# Patient Record
Sex: MALE | Race: WHITE | NOT HISPANIC OR LATINO | Employment: FULL TIME | URBAN - METROPOLITAN AREA
[De-identification: names, ages, dates, MRNs, and addresses within clinical notes are randomized per-mention and may not be internally consistent; named-entity substitution may affect disease eponyms.]

---

## 2018-01-22 ENCOUNTER — OFFICE VISIT (OUTPATIENT)
Dept: NURSING | Facility: CLINIC | Age: 38
End: 2018-01-22
Payer: COMMERCIAL

## 2018-01-22 DIAGNOSIS — R07.9 CHEST PAIN: Primary | ICD-10-CM

## 2018-01-22 PROCEDURE — 99207 ZZC NO CHARGE NURSE ONLY: CPT

## 2018-01-22 NOTE — NURSING NOTE
RN Triage:     Chief Complaint   Patient presents with     Chest Pain       Initial There were no vitals taken for this visit. There is no height or weight on file to calculate BMI.      Assessment:  Pt walked into the clinic c/o chest pain, SOB, chills and sweats, in distress, difficulty speaking.     Triage Dispo: Huddle with Provider to determine plan: Nitro SL one spray and chewable ASA 81 mg, O2 2.5 L      Intervention: Ambulance called.    Marisabel Simeon RN

## 2018-01-22 NOTE — MR AVS SNAPSHOT
"              After Visit Summary   2018    Morgan Wills    MRN: [id number]           Patient Information     Date Of Birth          1980        Visit Information        Provider Department      2018 10:20 AM BA ANCILLARY [facility]        Today's Diagnoses     Chest pain    -  1      Care Instructions    Please refer to nurse note          Follow-ups after your visit        Who to contact     If you have questions or need follow up information about today's clinic visit or your schedule please contact [facility] directly at [phone].  Normal or non-critical lab and imaging results will be communicated to you by MyChart, letter or phone within 4 business days after the clinic has received the results. If you do not hear from us within 7 days, please contact the clinic through [employer] or phone. If you have a critical or abnormal lab result, we will notify you by phone as soon as possible.  Submit refill requests through [employer] or call your pharmacy and they will forward the refill request to us. Please allow 3 business days for your refill to be completed.          Additional Information About Your Visit        MyChart Information     [employer] lets you send messages to your doctor, view your test results, renew your prescriptions, schedule appointments and more. To sign up, go to [email or web address] . Click on \"Log in\" on the left side of the screen, which will take you to the Welcome page. Then click on \"Sign up Now\" on the right side of the page.     You will be asked to enter the access code listed below, as well as some personal information. Please follow the directions to create your username and password.     Your access code is: [email or web address]  Expires: 2018  8:54 AM     Your access code will  in 90 days. If you need help or a new code, please call your [facility] or [phone].        Care EveryWhere ID     This is your Care EveryWhere ID. " This could be used by other organizations to access your [place] medical records  [id number]         Blood Pressure from Last 3 Encounters:   No data found for BP    Weight from Last 3 Encounters:   No data found for Wt              Today, you had the following     No orders found for display       Primary Care Provider Office Phone # Fax #    [facility] [phone] [address] [facility] [address]        Equal Access to Services     LORY VASQUEZ : Hadii aad ku hadasho Soomaali, waaxda luqadaha, qaybta kaalmada adeegyada, waxay idiin hayaan adeeg kharash lachris santamaria. So [facility] [phone].    ATENCIÓN: Si habla español, tiene a matute disposición servicios gratuitos de asistencia lingüística. Llame al [phone].    We comply with applicable federal civil rights laws and [place] laws. We do not discriminate on the basis of race, color, national origin, age, disability, sex, sexual orientation, or gender identity.            Thank you!     Thank you for choosing [facility]  for your care. Our goal is always to provide you with excellent care. Hearing back from our patients is one way we can continue to improve our services. Please take a few minutes to complete the written survey that you may receive in the mail after your visit with us. Thank you!             Your Updated Medication List - Protect others around you: Learn how to safely use, store and throw away your medicines at www.disposemymeds.org.      Notice  As of 1/22/2018 11:59 PM    You have not been prescribed any medications.

## 2019-02-17 ENCOUNTER — TRANSFERRED RECORDS (OUTPATIENT)
Dept: HEALTH INFORMATION MANAGEMENT | Facility: CLINIC | Age: 39
End: 2019-02-17

## 2019-02-18 LAB
CREAT SERPL-MCNC: 0.81 MG/DL (ref 0.7–1.3)
GFR SERPL CREATININE-BSD FRML MDRD: >60 ML/MIN/1.73M2
GLUCOSE SERPL-MCNC: 99 MG/DL (ref 70–125)

## 2019-02-19 LAB
ALT SERPL-CCNC: 28 U/L (ref 0–45)
AST SERPL-CCNC: 16 U/L (ref 0–40)
TRIGL SERPL-MCNC: 272 MG/DL

## 2019-02-20 ENCOUNTER — ALLIED HEALTH/NURSE VISIT (OUTPATIENT)
Dept: NEUROLOGY | Facility: CLINIC | Age: 39
End: 2019-02-20
Attending: PSYCHIATRY & NEUROLOGY
Payer: COMMERCIAL

## 2019-02-20 ENCOUNTER — HOSPITAL ENCOUNTER (INPATIENT)
Facility: CLINIC | Age: 39
LOS: 2 days | Discharge: HOME | End: 2019-02-22
Attending: PSYCHIATRY & NEUROLOGY | Admitting: PSYCHIATRY & NEUROLOGY
Payer: COMMERCIAL

## 2019-02-20 ENCOUNTER — APPOINTMENT (OUTPATIENT)
Dept: MRI IMAGING | Facility: CLINIC | Age: 39
End: 2019-02-20
Attending: STUDENT IN AN ORGANIZED HEALTH CARE EDUCATION/TRAINING PROGRAM
Payer: COMMERCIAL

## 2019-02-20 DIAGNOSIS — R56.9 SEIZURES (H): Primary | ICD-10-CM

## 2019-02-20 DIAGNOSIS — F44.5 PSYCHOGENIC NONEPILEPTIC SEIZURE: Primary | ICD-10-CM

## 2019-02-20 PROBLEM — R40.4 SPELL OF ALTERED CONSCIOUSNESS: Status: ACTIVE | Noted: 2019-02-20

## 2019-02-20 LAB
ALBUMIN SERPL-MCNC: 3.7 G/DL (ref 3.4–5)
ALP SERPL-CCNC: 77 U/L (ref 40–150)
ALT SERPL W P-5'-P-CCNC: 44 U/L (ref 0–70)
ANION GAP SERPL CALCULATED.3IONS-SCNC: 8 MMOL/L (ref 3–14)
AST SERPL W P-5'-P-CCNC: 22 U/L (ref 0–45)
BILIRUB SERPL-MCNC: 0.4 MG/DL (ref 0.2–1.3)
BUN SERPL-MCNC: 13 MG/DL (ref 7–30)
CALCIUM SERPL-MCNC: 9 MG/DL (ref 8.5–10.1)
CHLORIDE SERPL-SCNC: 104 MMOL/L (ref 94–109)
CO2 SERPL-SCNC: 27 MMOL/L (ref 20–32)
CREAT SERPL-MCNC: 0.81 MG/DL (ref 0.66–1.25)
ERYTHROCYTE [DISTWIDTH] IN BLOOD BY AUTOMATED COUNT: 12.3 % (ref 10–15)
GFR SERPL CREATININE-BSD FRML MDRD: >90 ML/MIN/{1.73_M2}
GLUCOSE BLDC GLUCOMTR-MCNC: 163 MG/DL (ref 70–99)
GLUCOSE SERPL-MCNC: 96 MG/DL (ref 70–99)
HCT VFR BLD AUTO: 42.7 % (ref 40–53)
HGB BLD-MCNC: 14.9 G/DL (ref 13.3–17.7)
MCH RBC QN AUTO: 29.6 PG (ref 26.5–33)
MCHC RBC AUTO-ENTMCNC: 34.9 G/DL (ref 31.5–36.5)
MCV RBC AUTO: 85 FL (ref 78–100)
PLATELET # BLD AUTO: 202 10E9/L (ref 150–450)
POTASSIUM SERPL-SCNC: 3.9 MMOL/L (ref 3.4–5.3)
POTASSIUM SERPL-SCNC: 4.1 MMOL/L (ref 3.5–5)
PROT SERPL-MCNC: 7.2 G/DL (ref 6.8–8.8)
RBC # BLD AUTO: 5.04 10E12/L (ref 4.4–5.9)
SODIUM SERPL-SCNC: 139 MMOL/L (ref 133–144)
WBC # BLD AUTO: 6.4 10E9/L (ref 4–11)

## 2019-02-20 PROCEDURE — 93010 ELECTROCARDIOGRAM REPORT: CPT | Performed by: INTERNAL MEDICINE

## 2019-02-20 PROCEDURE — 25000132 ZZH RX MED GY IP 250 OP 250 PS 637: Performed by: STUDENT IN AN ORGANIZED HEALTH CARE EDUCATION/TRAINING PROGRAM

## 2019-02-20 PROCEDURE — 36415 COLL VENOUS BLD VENIPUNCTURE: CPT | Performed by: STUDENT IN AN ORGANIZED HEALTH CARE EDUCATION/TRAINING PROGRAM

## 2019-02-20 PROCEDURE — 25500064 ZZH RX 255 OP 636: Performed by: PSYCHIATRY & NEUROLOGY

## 2019-02-20 PROCEDURE — 70553 MRI BRAIN STEM W/O & W/DYE: CPT

## 2019-02-20 PROCEDURE — A9585 GADOBUTROL INJECTION: HCPCS | Performed by: PSYCHIATRY & NEUROLOGY

## 2019-02-20 PROCEDURE — 95951 ZZHC EEG VIDEO < 12 HR: CPT | Mod: 52,ZF

## 2019-02-20 PROCEDURE — 85027 COMPLETE CBC AUTOMATED: CPT | Performed by: STUDENT IN AN ORGANIZED HEALTH CARE EDUCATION/TRAINING PROGRAM

## 2019-02-20 PROCEDURE — 12000001 ZZH R&B MED SURG/OB UMMC

## 2019-02-20 PROCEDURE — 93005 ELECTROCARDIOGRAM TRACING: CPT

## 2019-02-20 PROCEDURE — 00000146 ZZHCL STATISTIC GLUCOSE BY METER IP

## 2019-02-20 PROCEDURE — 80053 COMPREHEN METABOLIC PANEL: CPT | Performed by: STUDENT IN AN ORGANIZED HEALTH CARE EDUCATION/TRAINING PROGRAM

## 2019-02-20 PROCEDURE — 40000802 ZZH SITE CHECK

## 2019-02-20 RX ORDER — ESCITALOPRAM OXALATE 5 MG/1
20 TABLET ORAL DAILY
COMMUNITY

## 2019-02-20 RX ORDER — GADOBUTROL 604.72 MG/ML
10 INJECTION INTRAVENOUS ONCE
Status: COMPLETED | OUTPATIENT
Start: 2019-02-20 | End: 2019-02-20

## 2019-02-20 RX ORDER — PANTOPRAZOLE SODIUM 40 MG/1
40 TABLET, DELAYED RELEASE ORAL 2 TIMES DAILY
Status: DISCONTINUED | OUTPATIENT
Start: 2019-02-20 | End: 2019-02-22 | Stop reason: HOSPADM

## 2019-02-20 RX ORDER — AMOXICILLIN 250 MG
2 CAPSULE ORAL
Status: DISCONTINUED | OUTPATIENT
Start: 2019-02-20 | End: 2019-02-22 | Stop reason: HOSPADM

## 2019-02-20 RX ORDER — AMOXICILLIN 250 MG
1 CAPSULE ORAL
Status: DISCONTINUED | OUTPATIENT
Start: 2019-02-20 | End: 2019-02-22 | Stop reason: HOSPADM

## 2019-02-20 RX ORDER — ESCITALOPRAM OXALATE 10 MG/1
20 TABLET ORAL DAILY
Status: DISCONTINUED | OUTPATIENT
Start: 2019-02-21 | End: 2019-02-22 | Stop reason: HOSPADM

## 2019-02-20 RX ORDER — ONDANSETRON 2 MG/ML
4 INJECTION INTRAMUSCULAR; INTRAVENOUS EVERY 6 HOURS PRN
Status: DISCONTINUED | OUTPATIENT
Start: 2019-02-20 | End: 2019-02-22 | Stop reason: HOSPADM

## 2019-02-20 RX ORDER — ACETAMINOPHEN 325 MG/1
650 TABLET ORAL EVERY 4 HOURS PRN
Status: DISCONTINUED | OUTPATIENT
Start: 2019-02-20 | End: 2019-02-22 | Stop reason: HOSPADM

## 2019-02-20 RX ORDER — LORAZEPAM 2 MG/ML
2 INJECTION INTRAMUSCULAR
Status: DISCONTINUED | OUTPATIENT
Start: 2019-02-20 | End: 2019-02-22 | Stop reason: HOSPADM

## 2019-02-20 RX ORDER — NALOXONE HYDROCHLORIDE 0.4 MG/ML
.1-.4 INJECTION, SOLUTION INTRAMUSCULAR; INTRAVENOUS; SUBCUTANEOUS
Status: DISCONTINUED | OUTPATIENT
Start: 2019-02-20 | End: 2019-02-22 | Stop reason: HOSPADM

## 2019-02-20 RX ORDER — ONDANSETRON 4 MG/1
4 TABLET, ORALLY DISINTEGRATING ORAL EVERY 6 HOURS PRN
Status: DISCONTINUED | OUTPATIENT
Start: 2019-02-20 | End: 2019-02-22 | Stop reason: HOSPADM

## 2019-02-20 RX ORDER — DIVALPROEX SODIUM 250 MG/1
1000 TABLET, DELAYED RELEASE ORAL
Status: DISCONTINUED | OUTPATIENT
Start: 2019-02-20 | End: 2019-02-21

## 2019-02-20 RX ORDER — LIDOCAINE 40 MG/G
CREAM TOPICAL
Status: DISCONTINUED | OUTPATIENT
Start: 2019-02-20 | End: 2019-02-22 | Stop reason: HOSPADM

## 2019-02-20 RX ORDER — PANTOPRAZOLE SODIUM 40 MG/1
40 TABLET, DELAYED RELEASE ORAL 2 TIMES DAILY
COMMUNITY

## 2019-02-20 RX ORDER — DIVALPROEX SODIUM 500 MG/1
1000 TABLET, DELAYED RELEASE ORAL
COMMUNITY

## 2019-02-20 RX ADMIN — ACETAMINOPHEN 650 MG: 325 TABLET, FILM COATED ORAL at 20:37

## 2019-02-20 RX ADMIN — PANTOPRAZOLE SODIUM 40 MG: 40 TABLET, DELAYED RELEASE ORAL at 20:37

## 2019-02-20 RX ADMIN — GADOBUTROL 10 ML: 604.72 INJECTION INTRAVENOUS at 19:32

## 2019-02-20 RX ADMIN — DIVALPROEX SODIUM 1000 MG: 250 TABLET, DELAYED RELEASE ORAL at 18:07

## 2019-02-20 ASSESSMENT — VISUAL ACUITY
OU: DOUBLE VISION/DIPLOPIA
OU: DOUBLE VISION/DIPLOPIA

## 2019-02-20 ASSESSMENT — ACTIVITIES OF DAILY LIVING (ADL): ADLS_ACUITY_SCORE: 21
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
## 2019-02-21 ENCOUNTER — ALLIED HEALTH/NURSE VISIT (OUTPATIENT)
Dept: NEUROLOGY | Facility: CLINIC | Age: 39
End: 2019-02-21
Attending: PSYCHIATRY & NEUROLOGY
Payer: COMMERCIAL

## 2019-02-21 ENCOUNTER — APPOINTMENT (OUTPATIENT)
Dept: PHYSICAL THERAPY | Facility: CLINIC | Age: 39
End: 2019-02-21
Attending: STUDENT IN AN ORGANIZED HEALTH CARE EDUCATION/TRAINING PROGRAM
Payer: COMMERCIAL

## 2019-02-21 DIAGNOSIS — R56.9 SEIZURES (H): Primary | ICD-10-CM

## 2019-02-21 LAB
ANION GAP SERPL CALCULATED.3IONS-SCNC: 6 MMOL/L (ref 3–14)
BUN SERPL-MCNC: 15 MG/DL (ref 7–30)
CALCIUM SERPL-MCNC: 9.1 MG/DL (ref 8.5–10.1)
CHLORIDE SERPL-SCNC: 104 MMOL/L (ref 94–109)
CO2 SERPL-SCNC: 27 MMOL/L (ref 20–32)
CREAT SERPL-MCNC: 0.78 MG/DL (ref 0.66–1.25)
ERYTHROCYTE [DISTWIDTH] IN BLOOD BY AUTOMATED COUNT: 12.2 % (ref 10–15)
GFR SERPL CREATININE-BSD FRML MDRD: >90 ML/MIN/{1.73_M2}
GLUCOSE SERPL-MCNC: 92 MG/DL (ref 70–99)
HCT VFR BLD AUTO: 43.1 % (ref 40–53)
HGB BLD-MCNC: 14.9 G/DL (ref 13.3–17.7)
INTERPRETATION ECG - MUSE: NORMAL
MCH RBC QN AUTO: 29.3 PG (ref 26.5–33)
MCHC RBC AUTO-ENTMCNC: 34.6 G/DL (ref 31.5–36.5)
MCV RBC AUTO: 85 FL (ref 78–100)
PLATELET # BLD AUTO: 190 10E9/L (ref 150–450)
POTASSIUM SERPL-SCNC: 4 MMOL/L (ref 3.4–5.3)
RBC # BLD AUTO: 5.09 10E12/L (ref 4.4–5.9)
SODIUM SERPL-SCNC: 138 MMOL/L (ref 133–144)
WBC # BLD AUTO: 5.5 10E9/L (ref 4–11)

## 2019-02-21 PROCEDURE — 95951 ZZHC EEG VIDEO EACH 24 HR: CPT | Mod: ZF

## 2019-02-21 PROCEDURE — 36415 COLL VENOUS BLD VENIPUNCTURE: CPT | Performed by: STUDENT IN AN ORGANIZED HEALTH CARE EDUCATION/TRAINING PROGRAM

## 2019-02-21 PROCEDURE — 25000132 ZZH RX MED GY IP 250 OP 250 PS 637: Performed by: STUDENT IN AN ORGANIZED HEALTH CARE EDUCATION/TRAINING PROGRAM

## 2019-02-21 PROCEDURE — 80048 BASIC METABOLIC PNL TOTAL CA: CPT | Performed by: STUDENT IN AN ORGANIZED HEALTH CARE EDUCATION/TRAINING PROGRAM

## 2019-02-21 PROCEDURE — 12000001 ZZH R&B MED SURG/OB UMMC

## 2019-02-21 PROCEDURE — 97162 PT EVAL MOD COMPLEX 30 MIN: CPT | Mod: GP

## 2019-02-21 PROCEDURE — 97530 THERAPEUTIC ACTIVITIES: CPT | Mod: GP

## 2019-02-21 PROCEDURE — 85027 COMPLETE CBC AUTOMATED: CPT | Performed by: STUDENT IN AN ORGANIZED HEALTH CARE EDUCATION/TRAINING PROGRAM

## 2019-02-21 RX ORDER — CALCIUM CARBONATE 500 MG/1
500 TABLET, CHEWABLE ORAL DAILY PRN
Status: DISCONTINUED | OUTPATIENT
Start: 2019-02-21 | End: 2019-02-21

## 2019-02-21 RX ORDER — DIVALPROEX SODIUM 250 MG/1
500 TABLET, DELAYED RELEASE ORAL ONCE
Status: COMPLETED | OUTPATIENT
Start: 2019-02-21 | End: 2019-02-21

## 2019-02-21 RX ORDER — DIVALPROEX SODIUM 250 MG/1
500 TABLET, DELAYED RELEASE ORAL
Status: DISCONTINUED | OUTPATIENT
Start: 2019-02-21 | End: 2019-02-21

## 2019-02-21 RX ORDER — CALCIUM CARBONATE 500 MG/1
500 TABLET, CHEWABLE ORAL 3 TIMES DAILY PRN
Status: DISCONTINUED | OUTPATIENT
Start: 2019-02-21 | End: 2019-02-22 | Stop reason: HOSPADM

## 2019-02-21 RX ADMIN — CALCIUM CARBONATE (ANTACID) CHEW TAB 500 MG 500 MG: 500 CHEW TAB at 21:41

## 2019-02-21 RX ADMIN — DIVALPROEX SODIUM 1000 MG: 250 TABLET, DELAYED RELEASE ORAL at 08:27

## 2019-02-21 RX ADMIN — CALCIUM CARBONATE (ANTACID) CHEW TAB 500 MG 500 MG: 500 CHEW TAB at 01:51

## 2019-02-21 RX ADMIN — DIVALPROEX SODIUM 500 MG: 250 TABLET, DELAYED RELEASE ORAL at 18:52

## 2019-02-21 RX ADMIN — CALCIUM CARBONATE (ANTACID) CHEW TAB 500 MG 500 MG: 500 CHEW TAB at 17:01

## 2019-02-21 RX ADMIN — PANTOPRAZOLE SODIUM 40 MG: 40 TABLET, DELAYED RELEASE ORAL at 19:47

## 2019-02-21 RX ADMIN — ESCITALOPRAM OXALATE 20 MG: 10 TABLET ORAL at 08:27

## 2019-02-21 RX ADMIN — PANTOPRAZOLE SODIUM 40 MG: 40 TABLET, DELAYED RELEASE ORAL at 08:27

## 2019-02-21 RX ADMIN — ACETAMINOPHEN 650 MG: 325 TABLET, FILM COATED ORAL at 16:30

## 2019-02-21 ASSESSMENT — ACTIVITIES OF DAILY LIVING (ADL)
TOILETING: 0-->INDEPENDENT
ADLS_ACUITY_SCORE: 14
RETIRED_EATING: 0-->INDEPENDENT
AMBULATION: 0-->INDEPENDENT
ADLS_ACUITY_SCORE: 14
ADLS_ACUITY_SCORE: 12
DRESS: 0-->INDEPENDENT
COGNITION: 0 - NO COGNITION ISSUES REPORTED
FALL_HISTORY_WITHIN_LAST_SIX_MONTHS: YES
SWALLOWING: 0-->SWALLOWS FOODS/LIQUIDS WITHOUT DIFFICULTY
WHICH_OF_THE_ABOVE_FUNCTIONAL_RISKS_HAD_A_RECENT_ONSET_OR_CHANGE?: FALL HISTORY
TRANSFERRING: 0-->INDEPENDENT
RETIRED_COMMUNICATION: 0-->UNDERSTANDS/COMMUNICATES WITHOUT DIFFICULTY
ADLS_ACUITY_SCORE: 18
NUMBER_OF_TIMES_PATIENT_HAS_FALLEN_WITHIN_LAST_SIX_MONTHS: 4
ADLS_ACUITY_SCORE: 15
BATHING: 0-->INDEPENDENT
ADLS_ACUITY_SCORE: 15

## 2019-02-21 ASSESSMENT — COLUMBIA-SUICIDE SEVERITY RATING SCALE - C-SSRS: 2. HAVE YOU ACTUALLY HAD ANY THOUGHTS OF KILLING YOURSELF IN THE PAST MONTH?: NO

## 2019-02-21 NOTE — PROGRESS NOTES
VEEG monitoring preliminary results:    VEEG has been running for over one hour.  EEG showed no significant abnormalities except some possible mild generalized slowing. No clinical or electrographic seizures were observed.    Em Dixon MD  Neurology

## 2019-02-21 NOTE — PROGRESS NOTES
[facility]  General Neurology Progress Note    Patient Name:  Morgan Wills  MRN:  [id number]    :  1980  Date of Service:  2019    Patient Summary:  Mr Morgan Wills is a 39-year-old gentleman with a past medical history of anxiety, ALAINA and GERD who is transferring from an OSH with concern for seizures.    Interval history:   No acute events overnight. No target spells captured.    Physical Examination   Vitals: /64 (BP Location: Right arm)   Pulse 83   Temp 96.7  F (35.9  C) (Oral)   Resp 16   SpO2 90%   General: Adult, in NAD, cooperative  HEENT: NC/AT  Cardiac: RRR   Chest: No respiratory distress  Abdomen: S/NT/ND  Extremities: No LE swelling.  Skin: No rash or lesion.     Neuro:  Mental status: Awake, alert, attentive, oriented. Speech is fluent, no dysarthria.  Cranial nerves: CN2-12 tested and no significant findings appreciated. Eyes conjugate, PERRLA, EOMI, VFF, face symmetric, facial sensation intact, shoulder shrug strong, tongue/uvula midline.   Motor: Tone within normal. 5/5 strength in all 4 extremities. No atrophy or twitches.   Reflexes: Normoreflexic and symmetric biceps, patellar, and achilles. Toes down-going.  Sensory: Intact to LT in all extremities, reports some reduction in pinprick in RUE  Coordination: FNF no dysmetria  Gait: Deferred.    Investigations   CBC & BMP wnl.    MR BRAIN W/O & W CONTRAST 2019 7:50 PM  Impression:  1. Normal signal and size of the mesial temporal lobes bilaterally.  2. No abnormal enhancement.    VEEG REPORT: DAY 1  DATE OF RECORDING/SERVICE DATE:  2019     IMPRESSION:  This day 1 video EEG is normal.  There are no target events captured.  No electrographic seizures, no epileptiform activities and no paroxysmal events were seen on this day.  Clinical correlation is advised.       Assessment and Plan:  Mr Morgan Wills is a 39-year-old gentleman with a past medical history of anxiety, ALAINA  and GERD who is transferring from an OSH with concern for seizures.  Has had multiple spells previously and is currently on Depakote 1 g twice daily.  There is concern for psychogenic nonepileptic spells at the outside hospital given that his prolactin level was within normal following his convulsive spells.  Nevertheless he has multiple risk factors for developing seizures including a history of meningitis and multiple concussions.  Will admit for seizure workup and continuous video EEG monitoring.     #Concern for seizures  - Differential includes psychogenic non-epileptic spells  - MRI Brain unremarkable  - Continuous vEEG monitoring, this far unremarkable  - Will taper off Depakote (500mg this PM and then discontinue)  - Sleep deprivation tonight  - Photic stimulation  - Ativan PRN for seizures  - Seizure precautions     #GERD  - Continue pantoprazole 40mg BID     #Anxiety  - Continue Lexapro 20mg daily       #Tobacco use  - Nicotine patch PRN  - Smoking cessation education     FEN: Regular diet, off IVF  DVT ppx: SCDs  Code: Full code      Disposition Plan   Expected discharge in 3-4 days to prior living arrangement once work up is complete/target spells captured.     Entered: Maria L Meehan 02/21/2019, 3:45 PM     Patient was seen and discussed with Dr. Pavon.     Maria L Meehan  Neurology PGY2  P: [phone]

## 2019-02-21 NOTE — PLAN OF CARE
"Status: Pt admitted to 6a for seizure monitoring, seizure precautions in place    VS: VSS on RA   Neuros: Alert & oriented x4, n/t bilaterally to upper and lower  extremities, pt stated double vision-pt notes it has gotten worse the past few weeks, VEEG in place, no events reported/witnessed this shift     GI: Regular diet, BS +   : Voiding spontaneously without difficulty   IV: PIV SL   Activity: Up with assist of 1-2, GB, walker, pt states he is \"weak\" on his feet   Pain: Pt complained of a HA, controlled with PRN Tylenol   Respiratory/Trach: WNL   Skin: Intact   Social: Pt's brother at bedside for portion of shift, involved in cares   Plan of care: Continue to monitor and follow POC       "

## 2019-02-21 NOTE — PLAN OF CARE
PT 6A:  Evaluation completed and treatment initiated.  Discharge Planner PT   Patient plan for discharge: home with assist from suyapa  Current status: Pt's history reporting is verbose and includes impairments with every extremity and many post-concussive symptoms.  Question psychosomatic component to his medical problems.  LE strength is intact, balance is slightly impaired, gait is generally symmetrical without major impairments.  Recommend pt ambulate with SBA from RN staff, gait belt 4x/day to increase activity tolerance.  PT will follow pt at a low frequency to ensure LE stiffness (self-reported) and fatigue improve with walking program.  Barriers to return to prior living situation: Medical status; also recommend OT eval before discharge  Recommendations for discharge: Home with assist from suyapa  Rationale for recommendations: Pt is able to mobilize near his baseline currently.  He describes difficulty with LB dressing, medication management, vision, and sequencing his morning routine - recommend OT assess this.       Entered by: Genny Stokes 02/21/2019 11:00 AM

## 2019-02-21 NOTE — PLAN OF CARE
VSS.  Denied pain.  Milk and Tums given for heartburn/indigestion with full relief.  VEEG leads in place; no events witnessed or reported.  Seizure precautions in place; pt compliant.  Pt with blurred vision, dizziness when tracking, and BLE 4/5.  1st assessment pt reported decreased sensation to R face and RUE with touch, pt denied during 2nd assessment.  PIV SL.  Voiding spontaneously in the urinal.  No BM this shift. Declined OOB activity overnight despite encouragement.  Pt informed he will have to get OOB during the day today.  Per report, up with 1-2, GB, and walker.  PCD's on overnight.  On regular diet.  Continue with POC.

## 2019-02-21 NOTE — PROCEDURES
VEEG REPORT: DAY 1  EEG #-1     DATE OF RECORDING/SERVICE DATE:  02/21/2019   SOURCE FILE DURATION:  2:59:30       HISTORY:  This is day 1 of video-EEG monitoring in a 39-year-old gentleman who has had multiple seizure-like events of generalized convulsions, either with or without the use of alcohol and was admitted for further seizure characterization.  Video EEG was started for the evaluation of seizure.      MEDICATIONS:  Depakote 1000 mg b.i.d.  Lexapro to 20 mg daily.     TECHNICAL SUMMARY: This continuous video- EEG monitoring procedure was performed with 23 scalp electrodes in 10-20 electrode system placement, and additional scalp, precordial and other surface electrodes used for electrical referencing and artifact detection.  Video monitoring was utilized and periodically reviewed by EEG technologists and the physician for electroclinical correlations.     BACKGROUND:  There is low amplitude and intermittent parieto-occipital rhythm that is symmetric with frequencies ranging from 8.5-9.5 Hz and attenuation with eye opening.  There is generalized theta slowing seen during periods of drowsiness, specifically in the parietooccipital rhythm.  During stage II of sleep, there is a symmetric and well-formed sleep spindles, abundant vertex transients, and K complexes noted.  There is generalized theta slowing seen during the awake and states, maximum amplitudes seen in the left hemisphere.  A good example at 21:19:58.       ACTIVATION PROCEDURES:  At 21:11, the patient has asked multiple orientation questions as well as asked to follow multiple step commands of which she does so appropriately with increased myogenic artifact and increased frequency in background signaling.      EPILEPTIFORM ACTIVITIES/ICTAL ACTIVITIES:  None.      IMPRESSION:  This day 1 video EEG is normal.  There are no target events captured.  No electrographic seizures, no epileptiform activities and no paroxysmal events were seen on this  day.  Clinical correlation is advised.        This report is dictated by Parth Poe DO.  CNP fellow.      This report was reviewed by the epilepsy staff/supervisor:  Dr. Em Dixon MD          Dictated By:  Parth Poe DO.  Clinical Neurophysiology Fellow  I agree with the findings as reported.  I personally reviewed this EEG recording.  Em Dixon M.D Ph.D               D: 2019   T: 2019   MT: TALYA      Name:     ABIGAIL GUIDRY   MRN:      [id number]        Account:        [id number]   :      1980           Procedure Date: 2019      Document: [id number]

## 2019-02-21 NOTE — PLAN OF CARE
Pt was admitted for a spell of altered consciousness.  His vital signs have been stable. 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 His pupils reacted equally and appropriately to light. His sensation was intact in his face and extremities. He had some slight numbness in his right arm this morning but was not experiencing it at the 1200 neuro assessment. He had some slight weakness in his lower extremities but is able to ambulate with a stand by assist. He currently has a reduced Depakote  prescription and is on sleep deprivation to induce a seizure. He is tolerating a regular diet. He has denied pain during the shift. He has an IV on his left forearm that is saline locked. His lung sounds are clear bilaterally.

## 2019-02-21 NOTE — PROGRESS NOTES
02/21/19 1241   Quick Adds   Type of Visit Initial PT Evaluation   Living Environment   Lives With (ane)   Living Arrangements house   Home Accessibility stairs within home;stairs to enter home   Number of Stairs, Main Entrance four  (from garage)   Number of Stairs, Within Home, Primary ten  (to basement)   Transportation Anticipated family or friend will provide   Living Environment Comment Pt and ignacia live in basement level of house, ignacia's brother lives in the upper level.  Pt has not been driving last 2-3mo 2/2 seizures   Self-Care   Usual Activity Tolerance moderate   Current Activity Tolerance moderate   Regular Exercise No   Equipment Currently Used at Home none   Activity/Exercise/Self-Care Comment Pt reports significant decline in activity tolerance in last 3 mos, leading to a 30lb weight gain.  He works at a gas station, standing most of the day.  Ignacia works 2 jobs to help with finances, and is quite busy   Functional Level Prior   Ambulation 0-->independent   Transferring 0-->independent   Toileting 0-->independent   Bathing 0-->independent   Communication 0-->understands/communicates without difficulty   Swallowing 0-->swallows foods/liquids without difficulty   Cognition 0 - no cognition issues reported   Fall history within last six months yes   Number of times patient has fallen within last six months 10   General Information   Onset of Illness/Injury or Date of Surgery - Date 02/20/19   Referring Physician Maria L Meehan MD   Patient/Family Goals Statement return to exercise, has less seizures   Pertinent History of Current Problem (include personal factors and/or comorbidities that impact the POC) Mr Morgan Wills is a 39-year-old gentleman with a past medical history of anxiety, ALAINA and GERD who is transferring from an OSH with concern for seizures.  Has had multiple spells previously and is currently on Depakote 1 g twice daily.  There was concern for psychogenic nonepileptic spells at  the outside hospital given that his prolactin level was within normal following his convulsive spells.  Nevertheless he has multiple risk factors for developing seizures including a history of meningitis and multiple concussions.  Will admit for seizure workup and continuous video EEG monitoring.   Precautions/Limitations fall precautions;seizure precautions   Cognitive Status Examination   Orientation orientation to person, place and time   Level of Consciousness alert   Follows Commands and Answers Questions 100% of the time   Personal Safety and Judgment at risk behaviors demonstrated   Cognitive Comment Pt reports multiple symptoms that appear post-concussive - memory deficits, headaches, difficulty sequencing AM ADL and routine.   Pain Assessment   Patient Currently in Pain No   Integumentary/Edema   Integumentary/Edema no deficits were identifed   Posture    Posture Protracted shoulders   Range of Motion (ROM)   ROM Comment WFL all extremities   Strength   Strength Comments 5/5 BLE   Bed Mobility   Bed Mobility Comments mod (I)   Transfer Skills   Transfer Comments mod (I)   Gait   Gait Comments no device.  grossly symmetric pattern.  no LOB with horizonal and vertical head turns.  Speed is decrased, slightly wider GAYLE   Balance   Balance Comments positive romberg x24sec.  also has LOB with visual fields removed after x6sec   Sensory Examination   Sensory Perception Comments pt reports BUE numbness/tingling in the AMs and sometimes in B feet   General Therapy Interventions   Planned Therapy Interventions progressive activity/exercise;home program guidelines;balance training;neuromuscular re-education   Clinical Impression   Criteria for Skilled Therapeutic Intervention yes, treatment indicated   PT Diagnosis impaired balance   Influenced by the following impairments balance, sensation   Functional limitations due to impairments decreased (I) work duties and community mobility   Clinical Presentation  "Evolving/Changing   Clinical Presentation Rationale EEG monitoring   Clinical Decision Making (Complexity) Moderate complexity   Therapy Frequency` 2 times/week   Predicted Duration of Therapy Intervention (days/wks) 1 week   Anticipated Discharge Disposition Home with Assist   Risk & Benefits of therapy have been explained Yes   Patient, Family & other staff in agreement with plan of care Yes   [facility] AM-PAC  \"6 Clicks\" V.2 Basic Mobility Inpatient Short Form   1. Turning from your back to your side while in a flat bed without using bedrails? 4 - None   2. Moving from lying on your back to sitting on the side of a flat bed without using bedrails? 4 - None   3. Moving to and from a bed to a chair (including a wheelchair)? 4 - None   4. Standing up from a chair using your arms (e.g., wheelchair, or bedside chair)? 4 - None   5. To walk in hospital room? 4 - None   6. Climbing 3-5 steps with a railing? 4 - None   Basic Mobility Raw Score (Score out of 24.Lower scores equate to lower levels of function) 24   Total Evaluation Time   Total Evaluation Time (Minutes) 8     "

## 2019-02-22 ENCOUNTER — ALLIED HEALTH/NURSE VISIT (OUTPATIENT)
Dept: NEUROLOGY | Facility: CLINIC | Age: 39
End: 2019-02-22
Attending: PSYCHIATRY & NEUROLOGY
Payer: COMMERCIAL

## 2019-02-22 ENCOUNTER — APPOINTMENT (OUTPATIENT)
Dept: OCCUPATIONAL THERAPY | Facility: CLINIC | Age: 39
End: 2019-02-22
Attending: STUDENT IN AN ORGANIZED HEALTH CARE EDUCATION/TRAINING PROGRAM
Payer: COMMERCIAL

## 2019-02-22 VITALS
OXYGEN SATURATION: 93 % | TEMPERATURE: 97.5 F | RESPIRATION RATE: 16 BRPM | DIASTOLIC BLOOD PRESSURE: 91 MMHG | HEART RATE: 73 BPM | SYSTOLIC BLOOD PRESSURE: 126 MMHG

## 2019-02-22 DIAGNOSIS — R56.9 SEIZURES (H): Primary | ICD-10-CM

## 2019-02-22 PROCEDURE — 97166 OT EVAL MOD COMPLEX 45 MIN: CPT | Mod: GO | Performed by: OCCUPATIONAL THERAPIST

## 2019-02-22 PROCEDURE — 97535 SELF CARE MNGMENT TRAINING: CPT | Mod: GO | Performed by: OCCUPATIONAL THERAPIST

## 2019-02-22 PROCEDURE — 97110 THERAPEUTIC EXERCISES: CPT | Mod: GO | Performed by: OCCUPATIONAL THERAPIST

## 2019-02-22 PROCEDURE — 95951 ZZHC EEG VIDEO < 12 HR: CPT | Mod: 52,ZF

## 2019-02-22 PROCEDURE — 25000132 ZZH RX MED GY IP 250 OP 250 PS 637: Performed by: STUDENT IN AN ORGANIZED HEALTH CARE EDUCATION/TRAINING PROGRAM

## 2019-02-22 RX ORDER — IBUPROFEN 200 MG
400 TABLET ORAL EVERY 6 HOURS PRN
Status: DISCONTINUED | OUTPATIENT
Start: 2019-02-22 | End: 2019-02-22 | Stop reason: HOSPADM

## 2019-02-22 RX ADMIN — CALCIUM CARBONATE (ANTACID) CHEW TAB 500 MG 500 MG: 500 CHEW TAB at 08:39

## 2019-02-22 RX ADMIN — ESCITALOPRAM OXALATE 20 MG: 10 TABLET ORAL at 08:39

## 2019-02-22 RX ADMIN — ACETAMINOPHEN 650 MG: 325 TABLET, FILM COATED ORAL at 01:02

## 2019-02-22 RX ADMIN — PANTOPRAZOLE SODIUM 40 MG: 40 TABLET, DELAYED RELEASE ORAL at 08:39

## 2019-02-22 ASSESSMENT — ACTIVITIES OF DAILY LIVING (ADL)
ADLS_ACUITY_SCORE: 15
ADLS_ACUITY_SCORE: 12

## 2019-02-22 NOTE — PLAN OF CARE
"Pt here for video-sz monitoring, vss, neuros include: a/o x4, @ baseline blurry/double vision in L eye, particularly w/tracking and pt reports \"legally blind\" in L eye. Pt had sz-event this evening, see previous RN note, up SBA w/GB, regular diet, voiding spont, had BM this afternoon. PRN APA and Tums provided after event d/t heart burn and HA, good relief. Pt's AED decreased, pt sleep deprived starting tonight. Continue to monitor per MD orders.   "

## 2019-02-22 NOTE — PROCEDURES
VEEG REPORT: DAY 3  EEG #:  -2    DATE OF RECORDING/SERVICE DATE:  02/21/2019   SOURCE FILE DURATION:  23:22:54.      HISTORY:  This is day #2 of video EEG monitoring in a 39-year-old male who has had multiple seizure-like events over his lifetime, thought to be associated with alcohol, who was admitted for further seizure characterization.  Video EEG was started for the evaluation of seizure.      MEDICATIONS:  Depakote 1000/500 mg.  Lexapro 20 mg daily     TECHNICAL SUMMARY: This continuous video- EEG monitoring procedure was performed with 23 scalp electrodes in 10-20 electrode system placement, and additional scalp, precordial and other surface electrodes used for electrical referencing and artifact detection.  Video monitoring was utilized and periodically reviewed by EEG technologists and the physician for electroclinical correlations.     BACKGROUND:  During quiet restfulness, the patient has a well-developed, symmetric parietal occipital rhythm between 10.5 and 11.5 Hz that attenuates with eye opening.  During states of drowsiness, there is a generalized slowing, specifically seen in the parietal occipital rhythm, into the theta frequencies.  During stage II of sleep, there is abundant and symmetric sleep spindles, vertex transients, and K complexes.  There are deeper states of sleep seen with characteristic generalized delta slowing seen.  There is no focal slowing seen on this day.      ACTIVATION PROCEDURES:  Photic stimulation and hyperventilation are performed.  During photic stimulation, there is photic driving of the parietal occipital rhythm in faster frequencies.  During hyperventilation and post-hyperventilation, there is increased amplitude seen in the parietal occipital rhythm, as well as other generalized rhythm rhythms into theta frequencies.      EPILEPTIFORM ACTIVITIES:  None.      ICTAL ACTIVITIES:  There is one event of generalized shaking, following abrupt awakening at 15:47:34.   The patient is sleeping in stage II of sleep when an RN enters the room and awakens the patient by jostling him.  The patient does not respond immediately, and an event button is pressed.  The patient then makes subtle, full-body jerking motions at 15:48:49 and following this has difficulty following commands such as showing two fingers or pointing to the ceiling.  At 15:53:50, the patient develops right-handed tremor and is staring straight ahead while having sternal rub performed.  The patient then relaxes by 15:56:07.  Electrographically, the patient does awaken from stage II of sleep with parietal occipital rhythm being noted.  There is motion artifact in the bifrontal regions.  It becomes more prominent between 15:48:56 and 15:49:29 and occurs intermittently throughout the event and other periods of the recording.  There is otherwise no change in the electrographic background.      IMPRESSION:  This day #2 video EEG is normal.  Event recorded a generalized shaking and inability to maintain attention.  Did not have EEG correlate consistent with electrographic seizure and is considered nonepileptic.  No epileptiform activities or electrographic seizures are otherwise noted.  Clinical correlation is advised.      This report is dictated by Dr. Parth Poe DO.  CNP fellow.      This report will be reviewed by epilepsy staff/supervisor:  Dr. Em Dixon.          Dictated By:  Parth Poe DO.  Clinical Neurophysiology Fellow  I agree with the findings as reported.  I personally reviewed this EEG recording.  Em Dixon M.D Ph.D               D: 2019   T: 2019   MT: CONNOR      Name:     ABIGAIL GUIDRY   MRN:      [id number]        Account:        [id number]   :      1980           Procedure Date: 2019      Document: [id number]

## 2019-02-22 NOTE — PROGRESS NOTES
Assumed care [id number]  Pt here for VEEG monitoring. No events noticed during shift, pt had one event this am see previous nurses note. Neuro intact except for blurry/double vision to L eye per pt baseline. Pt up walking SBA w/ GB. Plan for sleep deprivation tonight. Gave pt PRN tums x1 for heartburn. Continue to monitor and follow POC.

## 2019-02-22 NOTE — PLAN OF CARE
Discharge Planner OT   Patient plan for discharge: Home with assist  Current status: OT eval and tx completed. Pt reports several head injuries in the past couple of years which have resulted in memory difficulties and trouble concentrating; concern for post-concussive syndrome - pt educated on recommendation for follow up with OP OT to learn strategies and compensatory techniques to manage IADLs. Pt may also benefit from OP Neuropsych testing to further assess sxs. Pt scored 25/30 on MOCA indicating mild impairment - pt reports difficulty at times with meds, finances, etc however fiance does assist. Pt is (I) with mobility and transfers; reported (I) with toileting since hospitalization. Pt reported only ADL difficulty is LE dressing due to back pain - educated on AE and able to demonstrate with mod (I) following education.  Barriers to return to prior living situation: none  Recommendations for discharge: Home with continued supervision with IADLs as needed due to cognitive deficits and OP OT; also may benefit from OP Neuropsych testing  Rationale for recommendations: to increase pt's safety and (I) with ADL/IADLs       Entered by: Iram Hunt 02/22/2019 12:11 PM     Occupational Therapy Discharge Summary    Reason for therapy discharge:    All goals and outcomes met, no further needs identified.    Progress towards therapy goal(s). See goals on Care Plan in [facility] electronic health record for goal details.  IP OT Goals met    Therapy recommendation(s):    Continued therapy is recommended.  Rationale/Recommendations:  OP OT to further address higher level cognitive deficits and potential post-concussive syndrome as pt reports impairment with IADLs.

## 2019-02-22 NOTE — PLAN OF CARE
Pt here for video-sz monitoring, vss, neuros: see flowsheet. PIV removed d/t discharge orders. No events were witnessed/reported during shift, pt compliant, worked w/therapies. RN went over discharge paperwork, no medications were changed but RN emphasized the dose/frequency of medication to take. MD Wall) sending out for a Rx to pt's pharmacy. Pt left w/friend to go home.

## 2019-02-22 NOTE — PLAN OF CARE
VSS. Reported chest/musculoskeletal pain; see previous note by this RN.  Declined need of interventions for heartburn.  VEEG leads in place.  Pt sleep deprived overnight with nap from 2704-0724; now to be up until 2200.  Pt feels he may have had an event in his sleep based on how he felt waking up this AM (burning eyes, woke up with sheets wets 2/2 sweating).  No events witnessed by RN.  Seizure precautions in place; pt compliant.  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  Voiding spontaneously in the urinal.  No BM this shift. Up with SBA and GB; ambulated halls x 1.  Continue with POC.

## 2019-02-22 NOTE — PLAN OF CARE
Time/length of seizure event:@ approx 1600, lasting approx 10mins  Movements (head, eyes, extremities): RUE, bilateral eyes, face  Orientation during seizure:PRAVEENA  After seizure, remembers the unique phrase given during seizure:N/A  After seizure, remembers an unnamed visual object shown during seizure:N/A  Able to identify/name aloud item during seizure:PRAVEENA  Able to follow command during seizure:No  Able to read test sentence aloud during seizure: N/A  Able to read test sentence aloud again after the seizure:PRAVEENA  After seizure, remembers name of object shown during seizure:N/A  Orientation and level of consciousness after seizure:pt was alert but lethargic and groggy.  VS and oxygen saturation during/after seizure:WNL  Recall of the event?:No  Was this a typical seizure/event?:yes  Presence of aura or pre-seizure activity:No  Incontinence:No    RN entered pt room to start shift assessment, pt appeared sleeping and diaphoretic. RN attempted to wake pt and pt was unresponsive, sz-button was pressed, VS taken and ROAR was initiated. Pt was unable to perform ROAR, eyes rolling back in eyes, very diaphoretic, RUE tremors and some facial tremoring. MDs were paged, pt received a cold colonic by MD team. Pt safe in bed, no medications needed for interventions and pt came-to after approx 10-15mins.

## 2019-02-22 NOTE — PROVIDER NOTIFICATION
"  Dr. Iqbal paged at 0053 after pt reported left chest pain that radiated down L arm.  Pt described pain and achy and sore that worsened when RN pressed on chest.  VSS.  Denied SOB but felt more \"winded\" during his most recent walk when compared to his other 3 walks earlier in the day.   PRN Ibroprofen ordered for likely musculoskeletal pain.  Pt refused Ibuprofen as it irritates his stomach.  Tylenol given with some relief.  Will continue to monitor.  "

## 2019-02-22 NOTE — PROGRESS NOTES
02/22/19 1159   Quick Adds   Type of Visit Initial Occupational Therapy Evaluation   Living Environment   Lives With other (see comments)  (suyapa)   Living Arrangements house   Home Accessibility stairs to enter home;stairs within home   Transportation Anticipated family or friend will provide   Living Environment Comment Pt reports he and his fidinesh rent the basement of a house; there are 4 ZAY and then a full flight down to basement; pt's suyapa works 2 jobs outside the home   Self-Care   Usual Activity Tolerance moderate   Current Activity Tolerance moderate   Regular Exercise No   Equipment Currently Used at Home (owns cane)   Activity/Exercise/Self-Care Comment Pt reports he is unable to exercise due to multiple ailments including chronic back pain   Functional Level   Ambulation 0-->independent   Transferring 0-->independent   Toileting 0-->independent   Bathing 0-->independent   Dressing 2-->assistive person   Eating 0-->independent   Communication 0-->understands/communicates without difficulty   Swallowing 0-->swallows foods/liquids without difficulty   Cognition 1 - attention or memory deficits   Fall history within last six months yes   Number of times patient has fallen within last six months 10   Which of the above functional risks had a recent onset or change? 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); pt reports he does sometimes need assist for donning socks/shoes due to back pain and suyapa assists with meds and finances due to STM deficits; pt also reports suyapa has been doing all the driving recently due to seizures (however then later contradicts himself and states he drove to the [employer] last week)   General Information   Onset of Illness/Injury or Date of Surgery - Date 02/20/19   Referring Physician Maria L Meehan MD   Additional Occupational Profile Info/Pertinent History of  Current Problem Pt is a 39-year-old gentleman with a past medical history of anxiety, ALAINA and GERD who is transferring from an OSH with concern for seizures   Precautions/Limitations seizure precautions   Weight-Bearing Status - LUE full weight-bearing   Weight-Bearing Status - RUE full weight-bearing   Weight-Bearing Status - LLE full weight-bearing   Weight-Bearing Status - RLE full weight-bearing   Cognitive Status Examination   Orientation orientation to person, place and time   Level of Consciousness alert   Follows Commands (Cognition) WNL   Memory impaired   Cognitive Comment Pt reports multiple head injuries in past couple of years resulting in sporadic difficulty with memory and thinking; sounds like issues with post-concussive syndrome; pt scored 25/30 on MOCA; pt reports some interference with daily activity (needs reminders for meds, help with finances, etc)   Visual Perception   Visual Perception Comments Pt is supposed to wear glasses however does not currently due to inability to find properly fitted pair (per his reports due to insurance limitations) - pt states he is able to see well enough to read and functional without them; pt is legally blind in L eye   Pain Assessment   Patient Currently in Pain No   Range of Motion (ROM)   ROM Comment BUEs WFL   Strength   Strength Comments BUEs WFL   Transfer Skill: Sit to Stand   Level of [place]cock: Sit/Stand independent   Transfer Skill: Toilet Transfer   Level of [place]: Toilet independent  (pt states (I))   Activities of Daily Living Analysis   Impairments Contributing to Impaired Activities of Daily Living cognition impaired;pain   General Therapy Interventions   Planned Therapy Interventions ADL retraining;IADL retraining;bed mobility training;cognition;ROM;strengthening;stretching;transfer training;home program guidelines;progressive activity/exercise   Clinical Impression   Criteria for Skilled Therapeutic Interventions Met yes, treatment  "indicated   OT Diagnosis decreased ADL/IADL (I)   Influenced by the following impairments pain, impaired cognition   Assessment of Occupational Performance 3-5 Performance Deficits   Identified Performance Deficits dressing, medication management, work   Clinical Decision Making (Complexity) Moderate complexity   Therapy Frequency daily   Predicted Duration of Therapy Intervention (days/wks) eval + 1 tx   Anticipated Discharge Disposition Home with Outpatient Therapy   Risks and Benefits of Treatment have been explained. Yes   Patient, Family & other staff in agreement with plan of care Yes   [facility] TM \"6 Clicks\"   2016, Trustees of [facility], under license to [employer].  All rights reserved.   6 Clicks Short Forms Daily Activity Inpatient Short Form   [facility] AM-PAC  \"6 Clicks\" Daily Activity Inpatient Short Form   1. Putting on and taking off regular lower body clothing? 3 - A Little   2. Bathing (including washing, rinsing, drying)? 4 - None   3. Toileting, which includes using toilet, bedpan or urinal? 4 - None   4. Putting on and taking off regular upper body clothing? 4 - None   5. Taking care of personal grooming such as brushing teeth? 4 - None   6. Eating meals? 4 - None   Daily Activity Raw Score (Score out of 24.Lower scores equate to lower levels of function) 23   Total Evaluation Time   Total Evaluation Time (Minutes) 15     "
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
## 2019-02-23 NOTE — PROCEDURES
VEEG REPORT: DAY 3  EEG #-3    DATE OF RECORDING/SERVICE DATE:  02/22/2019    SOURCE FILE DURATION:   10:48:19.      HISTORY:  This is day #3 of video EEG monitoring of a 39-year-old male who has had multiple seizure-like events and was admitted for further seizure characterization.  Video EEG was started for the evaluation of seizure.      MEDICATIONS: Depakote 500 mg b.i.d., Lexapro 20 mg daily.     TECHNICAL SUMMARY: This continuous video- EEG monitoring procedure was performed with 23 scalp electrodes in 10-20 electrode system placement, and additional scalp, precordial and other surface electrodes used for electrical referencing and artifact detection.  Video monitoring was utilized and periodically reviewed by EEG technologists and the physician for electroclinical correlations.     BACKGROUND:  During periods of quiet restfulness the patient has a well-defined parieto-occipital rhythm that is symmetric with frequencies ranging between 8.5 and 9.5 Hz and attenuation of frequencies with eye opening.  During periods of drowsiness, there is a generalized theta frequency seen most prominently in the parietal occipital region.  During stage II of sleep, there are symmetric sleep spindles, vertex transients, and K complexes noted.  Throughout the recording, there is increased myogenic activity in the bifrontal and bitemporal regions during the awakened state.        ACTIVATION PROCEDURES:  None.      EPILEPTIFORM ACTIVITIES/ICTAL ACTIVITIES:  None.      IMPRESSION:  This day #3 video EEG is normal.  No target events were captured on this day.  No electrographic seizures, epileptiform activities or paroxysmal events were seen on this day.  Clinical correlation is advised.      SUMMARY OF THREE DAYS OF VIDEO EEG MONITORING:  Throughout 3 days of video EEG monitoring, EEG remained normal.  There was 1 target event captured on video EEG day #2 that was a nonepileptic event.  Video EEG day #2 event consisted of  generalized shaking following being abruptly awoken then subtle full- body jerking with bilateral shoulders in extension and inability to follow multiple commands or respond orally.  There was no electrographic changes from background during this event.  There were no other epileptiform activities, and no electrographic seizure seen on any day of recording.      This report is dictated by Dr. Parth Poe DO.  CNP Fellow.      This report will be reviewed by Epilepsy Staff/Supervisor:  Em Dixon MD.          Dictated By:  Parth Poe DO.  Clinical Neurophysiology Fellow  I agree with the findings as reported.  I personally reviewed this EEG recording.  Em Dixon M.D Ph.D               D: 2019   T: 2019   MT: RISA      Name:     ABIGAIL GUIDRY   MRN:      [id number]        Account:        [id number]   :      1980           Procedure Date: 2019      Document: [id number]

## 2019-02-24 ENCOUNTER — PATIENT OUTREACH (OUTPATIENT)
Dept: CARE COORDINATION | Facility: CLINIC | Age: 39
End: 2019-02-24

## 2019-02-25 NOTE — PLAN OF CARE
Physical Therapy Discharge Summary    Reason for therapy discharge:    Discharged to home.    Progress towards therapy goal(s). See goals on Care Plan in [facility] electronic health record for goal details.  Goals not met.  Barriers to achieving goals:   discharge from facility.    Therapy recommendation(s):    No further PT is recommended. OT is recommending OP OT.

## 2019-03-07 NOTE — PROCEDURES
VEEG REPORT: DAY 1   EEG #-1      DATE OF RECORDING/SERVICE DATE:  02/20/2019    SOURCE FILE DURATION:  2:59:30         HISTORY:  This is day 1 of video-EEG monitoring in a 39-year-old gentleman who has had multiple seizure-like events of generalized convulsions, either with or without the use of alcohol and was admitted for further seizure characterization.  Video EEG was started for the evaluation of seizure.        MEDICATIONS:  Depakote 1000 mg b.i.d.  Lexapro to 20 mg daily.        TECHNICAL SUMMARY: This continuous video- EEG monitoring procedure was performed with 23 scalp electrodes in 10-20 electrode system placement, and additional scalp, precordial and other surface electrodes used for electrical referencing and artifact detection.  Video monitoring was utilized and periodically reviewed by EEG technologists and the physician for electroclinical correlations.       BACKGROUND:  There is low amplitude and intermittent parieto-occipital rhythm that is symmetric with frequencies ranging from 8.5-9.5 Hz and attenuation with eye opening.  There is generalized theta slowing seen during periods of drowsiness, specifically in the parietooccipital rhythm.  During stage II of sleep, there is a symmetric and well-formed sleep spindles, abundant vertex transients, and K complexes noted.  There is generalized theta slowing seen during the awake and states, maximum amplitudes seen in the left hemisphere.  A good example at 21:19:58.         ACTIVATION PROCEDURES:  At 21:11, the patient has asked multiple orientation questions as well as asked to follow multiple step commands of which she does so appropriately with increased myogenic artifact and increased frequency in background signaling.        EPILEPTIFORM ACTIVITIES/ICTAL ACTIVITIES:  None.        IMPRESSION:  This day 1 video EEG is normal.  There are no target events captured.  No electrographic seizures, no epileptiform activities and no paroxysmal events  were seen on this day.  Clinical correlation is advised.          This report is dictated by Romana Means DO.  CNP fellow.        This report was reviewed by the epilepsy staff/supervisor:  Dr. Em Dixon MD      Revised EEG date lg 3/7/19         EM DIXON MD       As dictated by ROMANA MEANS DO         Dictated By:  Romana Means DO.  Clinical Neurophysiology Fellow  I agree with the findings as reported.  I personally reviewed this EEG recording.  Em Dixon M.D Ph.D               D: 2019   T: 2019   MT: TALYA      Name:     ABIGAIL GUIDRY   MRN:      [id number]        Account:        [id number]   :      1980           Procedure Date: 2019      Document: [id number]

## 2019-03-07 NOTE — PROCEDURES
VEEG REPORT: DAY 2   EEG #:  -2     DATE OF RECORDING/SERVICE DATE:  02/21/2019    SOURCE FILE DURATION:  23:22:54.        HISTORY:  This is day #2 of video EEG monitoring in a 39-year-old male who has had multiple seizure-like events over his lifetime, thought to be associated with alcohol, who was admitted for further seizure characterization.  Video EEG was started for the evaluation of seizure.        MEDICATIONS:  Depakote 1000/500 mg.  Lexapro 20 mg daily        TECHNICAL SUMMARY: This continuous video- EEG monitoring procedure was performed with 23 scalp electrodes in 10-20 electrode system placement, and additional scalp, precordial and other surface electrodes used for electrical referencing and artifact detection.  Video monitoring was utilized and periodically reviewed by EEG technologists and the physician for electroclinical correlations.       BACKGROUND:  During quiet restfulness, the patient has a well-developed, symmetric parietal occipital rhythm between 10.5 and 11.5 Hz that attenuates with eye opening.  During states of drowsiness, there is a generalized slowing, specifically seen in the parietal occipital rhythm, into the theta frequencies.  During stage II of sleep, there is abundant and symmetric sleep spindles, vertex transients, and K complexes.  There are deeper states of sleep seen with characteristic generalized delta slowing seen.  There is no focal slowing seen on this day.        ACTIVATION PROCEDURES:  Photic stimulation and hyperventilation are performed.  During photic stimulation, there is photic driving of the parietal occipital rhythm in faster frequencies.  During hyperventilation and post-hyperventilation, there is increased amplitude seen in the parietal occipital rhythm, as well as other generalized rhythm rhythms into theta frequencies.        EPILEPTIFORM ACTIVITIES:  None.        ICTAL ACTIVITIES:  There is one event of generalized shaking, following abrupt  awakening at 15:47:34.  The patient is sleeping in stage II of sleep when an RN enters the room and awakens the patient by jostling him.  The patient does not respond immediately, and an event button is pressed.  The patient then makes subtle, full-body jerking motions at 15:48:49 and following this has difficulty following commands such as showing two fingers or pointing to the ceiling.  At 15:53:50, the patient develops right-handed tremor and is staring straight ahead while having sternal rub performed.  The patient then relaxes by 15:56:07.  Electrographically, the patient does awaken from stage II of sleep with parietal occipital rhythm being noted.  There is motion artifact in the bifrontal regions.  It becomes more prominent between 15:48:56 and 15:49:29 and occurs intermittently throughout the event and other periods of the recording.  There is otherwise no change in the electrographic background.        IMPRESSION:  This day #2 video EEG is normal.  Event recorded a spell of generalized shaking and inability to maintain attention.  This did not have EEG correlatioins and is considered nonepileptic.  No epileptiform activities or electrographic seizures are otherwise noted.  Clinical correlation is advised.        This report is dictated by Dr. Romana Means DO.  CNP fellow.        This report will be reviewed by epilepsy staff/supervisor:  Dr. Em Dixon.      Revised text lg 3/7/19         EM DIXON MD       As dictated by ROMANA MEANS DO           Dictated By:  Romana Means DO.  Clinical Neurophysiology Fellow  I agree with the findings as reported.  I personally reviewed this EEG recording.  Em Dixon M.D Ph.D             D: 2019   T: 2019   MT: CONNOR      Name:     ABIGAIL GUIDRY   MRN:      [id number]        Account:        [id number]   :      1980           Procedure Date: 2019      Document: [id number]

## 2019-03-14 ENCOUNTER — HOSPITAL ENCOUNTER (OUTPATIENT)
Dept: OCCUPATIONAL THERAPY | Facility: CLINIC | Age: 39
Setting detail: THERAPIES SERIES
End: 2019-03-14
Attending: STUDENT IN AN ORGANIZED HEALTH CARE EDUCATION/TRAINING PROGRAM
Payer: COMMERCIAL

## 2019-03-14 PROCEDURE — 97165 OT EVAL LOW COMPLEX 30 MIN: CPT | Mod: GO | Performed by: OCCUPATIONAL THERAPIST

## 2019-03-14 PROCEDURE — 97535 SELF CARE MNGMENT TRAINING: CPT | Mod: GO | Performed by: OCCUPATIONAL THERAPIST
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
## 2019-03-15 NOTE — PROGRESS NOTES
"Cognitive Assessment of [place]    SUMMARY OF TEST:  The Cognitive Assessment of [place] (CAM) is a standardized measure used to assess cognitive abilities and deficits.  The CAM is formatted to assess cognitive skills in the areas of attention, orientation, memory, following directions, temporal awareness, discrimination, sequencing, calculation, planning, verbal safety/judgment, problem solving and abstract reasoning.    DATE OF TESTING: 3/14/19    RESULTS OF TESTING:    The patient scores with no impairment in Attention, Remote memory, Recent memory, Visual neglect, Appropriate yes/no, One step verbal directions, Written directions, Immediate auditory memory, Immediate motor memory, Temporal awareness, Matching, Object identification, Motor recall/recognition, [place] recognition, Money skills mental manipulation, Single digit math skills, Multiple digit math skills, Planning, Simple problem solving, Moderate problem solving, Mental flexibility and Abstract reasoning    The patient scores with mild impairment in Visual memory/sequencing backward and Auditory memory/sequencing backward    The patient scores with moderate impairment in Visual memory/sequencing forward, Auditory recall/recognition, Auditory memory/sequencing forward, Complex problem solving and Safety/judgment    The patient scores with severe impairment in No areas of severe impairment    Test results comments:  none    INTERPRETATION OF TEST RESULTS:      Therapist informed pt that areas of moderate deficits may impact work (pt works at a [employer]) performance when novel situations occur and how to problem solve, if a safety situation occurs and how to seek help, during inventory intake/\"counting\" and when receiving auditory information in person or over the phone.     Factors affecting performance:    No additional problems noted    Recommendations: Recommend continuation of OT services to address moderate cognitive deficits to improve work " performance and engagement in IADLs.     TIME ADMINISTERING TEST: 40    TIME FOR INTERPRETATION AND PREPARATION OF REPORT: 20    TOTAL TIME: 60

## 2019-03-27 ENCOUNTER — HOSPITAL ENCOUNTER (OUTPATIENT)
Dept: OCCUPATIONAL THERAPY | Facility: CLINIC | Age: 39
Setting detail: THERAPIES SERIES
End: 2019-03-27
Attending: STUDENT IN AN ORGANIZED HEALTH CARE EDUCATION/TRAINING PROGRAM
Payer: COMMERCIAL

## 2019-03-27 PROCEDURE — 97530 THERAPEUTIC ACTIVITIES: CPT | Mod: GO | Performed by: OCCUPATIONAL THERAPIST

## 2019-03-27 PROCEDURE — 97535 SELF CARE MNGMENT TRAINING: CPT | Mod: GO | Performed by: OCCUPATIONAL THERAPIST

## 2019-03-27 NOTE — ADDENDUM NOTE
Encounter addended by: Camille Villatoro, OT on: 3/27/2019 3:23 PM   Actions taken: Sign clinical note, Document created, Document edited

## 2019-03-27 NOTE — PROGRESS NOTES
"                                                                                [facility]          OUTPATIENT OCCUPATIONAL THERAPY  EVALUATION  PLAN OF TREATMENT FOR OUTPATIENT REHABILITATION  (COMPLETE FOR INITIAL CLAIMS ONLY)  Patient's Last Name, First Name, M.I.  YOB: 1980  Morgan Wills                           Provider's Name  [facility] Medical Record No.  [id number]                               Onset Date:     02/22/19   Start of Care Date:     03/14/19   Type:     ___PT   _X_OT   ___SLP Medical Diagnosis:     H/o physcogenic non-epileptic spells, ALAINA, anxiety, seizures/\"spell\" increase in the last 3 months. Decreased executive functioning and memory.                          OT Diagnosis:     decreased memory and exeucitve functioning Visits from SOC:  1   _________________________________________________________________________________  Plan of Treatment/Functional Goals:  ADL training, Self care/Home management, Strengthening, Therapeutic activities                    Goals  Goal Identifier: relaxation  Goal Description: Pt will report use of 2-3 relaxation strategies, daily, to reduce stress and improve engagement in IADLs.    Target Date: 04/11/19     Goal Identifier: CAM  Goal Description: Pt will verbalize understanding of results of cognitive assessment and functional implications to improve engagement in IADLs.    Target Date: 03/15/19  Date Met: 03/15/19  Goal Identifier: memory  Goal Description: Pt will demonstrate 90% accuracy on memory recall and word finding activities/worksheets to improve function in I/ADLs.  Target Date: 04/11/19     Goal Identifier: multi-tasking  Goal Description: Pt will perform cognitive task/activity with 2 extraneous stimuli with 90% accuracy to address attention, divided attention needed for work tasks.    Target Date: 04/11/19                                                          Therapy Frequency: " 1x/wk x4 weeks     Predicted Duration of Therapy Intervention (days/wks): x4 weeks  Camille Villatoro OT          I CERTIFY THE NEED FOR THESE SERVICES FURNISHED UNDER        THIS PLAN OF TREATMENT AND WHILE UNDER MY CARE .             Physician Signature               Date    X_____________________________________________________                       ,    Certification date from: 03/14/19, Certification date to: 04/11/19               Primary Care Physician: Liliya Lares MD     Initial Assessment        See Epic Evaluation      Start Of Care Date: 03/14/19

## 2019-04-01 NOTE — ADDENDUM NOTE
Encounter addended by: Camille Villatoro OT on: 4/1/2019 3:49 PM   Actions taken: Sign clinical note

## 2019-04-01 NOTE — PROGRESS NOTES
"                                                                                [facility]          OUTPATIENT OCCUPATIONAL THERAPY  EVALUATION  PLAN OF TREATMENT FOR OUTPATIENT REHABILITATION  (COMPLETE FOR INITIAL CLAIMS ONLY)  Patient's Last Name, First Name, M.I.  YOB: 1980  Morgan Wills                           Provider's Name  [facility] Medical Record No.  [id number]                               Onset Date:     02/22/19   Start of Care Date:     03/14/19   Type:     ___PT   _X_OT   ___SLP Medical Diagnosis:     H/o physcogenic non-epileptic spells, ALAINA, anxiety, seizures/\"spell\" increase in the last 3 months. Decreased executive functioning and memory.                          OT Diagnosis:     decreased memory and exeucitve functioning Visits from SOC:  1   _________________________________________________________________________________  Plan of Treatment/Functional Goals:  ADL training, Self care/Home management, Strengthening, Therapeutic activities                    Goals  Goal Identifier: relaxation  Goal Description: Pt will report use of 2-3 relaxation strategies, daily, to reduce stress and improve engagement in IADLs.    Target Date: 04/11/19     Goal Identifier: CAM  Goal Description: Pt will verbalize understanding of results of cognitive assessment and functional implications to improve engagement in IADLs.    Target Date: 03/15/19  Date Met: 03/15/19  Goal Identifier: memory  Goal Description: Pt will demonstrate 90% accuracy on memory recall and word finding activities/worksheets to improve function in I/ADLs.  Target Date: 04/11/19     Goal Identifier: multi-tasking  Goal Description: Pt will perform cognitive task/activity with 2 extraneous stimuli with 90% accuracy to address attention, divided attention needed for work tasks.    Target Date: 04/11/19                                                          Therapy Frequency: " 1x/wk x4 weeks     Predicted Duration of Therapy Intervention (days/wks): x4 weeks  Camille Villatoro OT          I CERTIFY THE NEED FOR THESE SERVICES FURNISHED UNDER        THIS PLAN OF TREATMENT AND WHILE UNDER MY CARE     (Physician co-signature of this document indicates review and certification of the therapy plan).                 ,    Certification date from: 03/14/19, Certification date to: 04/11/19               Referring Physician: Maria L Meehan MD     Initial Assessment        See Epic Evaluation      Start Of Care Date: 03/14/19

## 2020-12-29 ENCOUNTER — HOSPITAL ENCOUNTER (EMERGENCY)
Facility: CLINIC | Age: 40
Discharge: HOME | End: 2020-12-29
Attending: EMERGENCY MEDICINE | Admitting: EMERGENCY MEDICINE
Payer: OTHER MISCELLANEOUS

## 2020-12-29 ENCOUNTER — APPOINTMENT (OUTPATIENT)
Dept: CT IMAGING | Facility: CLINIC | Age: 40
End: 2020-12-29
Attending: EMERGENCY MEDICINE
Payer: OTHER MISCELLANEOUS

## 2020-12-29 ENCOUNTER — APPOINTMENT (OUTPATIENT)
Dept: GENERAL RADIOLOGY | Facility: CLINIC | Age: 40
End: 2020-12-29
Attending: EMERGENCY MEDICINE
Payer: OTHER MISCELLANEOUS

## 2020-12-29 VITALS
HEART RATE: 76 BPM | RESPIRATION RATE: 16 BRPM | WEIGHT: 262 LBS | DIASTOLIC BLOOD PRESSURE: 84 MMHG | TEMPERATURE: 98.3 F | OXYGEN SATURATION: 95 % | SYSTOLIC BLOOD PRESSURE: 140 MMHG

## 2020-12-29 DIAGNOSIS — S09.90XA CLOSED HEAD INJURY, INITIAL ENCOUNTER: ICD-10-CM

## 2020-12-29 DIAGNOSIS — S06.0X0A CONCUSSION WITHOUT LOSS OF CONSCIOUSNESS, INITIAL ENCOUNTER: ICD-10-CM

## 2020-12-29 DIAGNOSIS — R07.81 RIB PAIN ON LEFT SIDE: ICD-10-CM

## 2020-12-29 LAB
ANION GAP SERPL CALCULATED.3IONS-SCNC: 6 MMOL/L (ref 3–14)
BASOPHILS # BLD AUTO: 0 10E9/L (ref 0–0.2)
BASOPHILS NFR BLD AUTO: 0.6 %
BUN SERPL-MCNC: 15 MG/DL (ref 7–30)
CALCIUM SERPL-MCNC: 9.3 MG/DL (ref 8.5–10.1)
CHLORIDE SERPL-SCNC: 109 MMOL/L (ref 94–109)
CO2 SERPL-SCNC: 25 MMOL/L (ref 20–32)
CREAT SERPL-MCNC: 0.78 MG/DL (ref 0.66–1.25)
DIFFERENTIAL METHOD BLD: NORMAL
EOSINOPHIL NFR BLD AUTO: 1.6 %
ERYTHROCYTE [DISTWIDTH] IN BLOOD BY AUTOMATED COUNT: 12.2 % (ref 10–15)
GFR SERPL CREATININE-BSD FRML MDRD: >90 ML/MIN/{1.73_M2}
GLUCOSE SERPL-MCNC: 98 MG/DL (ref 70–99)
HCT VFR BLD AUTO: 45.6 % (ref 40–53)
HGB BLD-MCNC: 16.2 G/DL (ref 13.3–17.7)
IMM GRANULOCYTES # BLD: 0 10E9/L (ref 0–0.4)
IMM GRANULOCYTES NFR BLD: 0.3 %
INR PPP: 0.96 (ref 0.86–1.14)
LYMPHOCYTES # BLD AUTO: 1.8 10E9/L (ref 0.8–5.3)
LYMPHOCYTES NFR BLD AUTO: 25.9 %
MCH RBC QN AUTO: 29.6 PG (ref 26.5–33)
MCHC RBC AUTO-ENTMCNC: 35.5 G/DL (ref 31.5–36.5)
MCV RBC AUTO: 83 FL (ref 78–100)
MONOCYTES # BLD AUTO: 0.4 10E9/L (ref 0–1.3)
MONOCYTES NFR BLD AUTO: 6.2 %
NEUTROPHILS # BLD AUTO: 4.6 10E9/L (ref 1.6–8.3)
NEUTROPHILS NFR BLD AUTO: 65.4 %
NRBC # BLD AUTO: 0 10*3/UL
NRBC BLD AUTO-RTO: 0 /100
PLATELET # BLD AUTO: 219 10E9/L (ref 150–450)
POTASSIUM SERPL-SCNC: 4.4 MMOL/L (ref 3.4–5.3)
RBC # BLD AUTO: 5.48 10E12/L (ref 4.4–5.9)
SODIUM SERPL-SCNC: 140 MMOL/L (ref 133–144)
WBC # BLD AUTO: 7 10E9/L (ref 4–11)

## 2020-12-29 PROCEDURE — 96374 THER/PROPH/DIAG INJ IV PUSH: CPT | Performed by: EMERGENCY MEDICINE

## 2020-12-29 PROCEDURE — 70450 CT HEAD/BRAIN W/O DYE: CPT

## 2020-12-29 PROCEDURE — 71101 X-RAY EXAM UNILAT RIBS/CHEST: CPT | Mod: LT

## 2020-12-29 PROCEDURE — 85025 COMPLETE CBC W/AUTO DIFF WBC: CPT | Performed by: EMERGENCY MEDICINE

## 2020-12-29 PROCEDURE — 250N000011 HC RX IP 250 OP 636: Performed by: EMERGENCY MEDICINE

## 2020-12-29 PROCEDURE — 80048 BASIC METABOLIC PNL TOTAL CA: CPT | Performed by: EMERGENCY MEDICINE

## 2020-12-29 PROCEDURE — 96375 TX/PRO/DX INJ NEW DRUG ADDON: CPT | Performed by: EMERGENCY MEDICINE

## 2020-12-29 PROCEDURE — 85610 PROTHROMBIN TIME: CPT | Performed by: EMERGENCY MEDICINE

## 2020-12-29 PROCEDURE — 99284 EMERGENCY DEPT VISIT MOD MDM: CPT | Performed by: EMERGENCY MEDICINE

## 2020-12-29 PROCEDURE — 99285 EMERGENCY DEPT VISIT HI MDM: CPT | Mod: 25 | Performed by: EMERGENCY MEDICINE

## 2020-12-29 RX ORDER — HYDROCODONE BITARTRATE AND ACETAMINOPHEN 5; 325 MG/1; MG/1
1 TABLET ORAL EVERY 6 HOURS PRN
Qty: 10 TABLET | Refills: 0 | Status: SHIPPED | OUTPATIENT
Start: 2020-12-29 | End: 2021-01-01

## 2020-12-29 RX ORDER — KETOROLAC TROMETHAMINE 30 MG/ML
30 INJECTION, SOLUTION INTRAMUSCULAR; INTRAVENOUS ONCE
Status: COMPLETED | OUTPATIENT
Start: 2020-12-29 | End: 2020-12-29

## 2020-12-29 RX ORDER — ONDANSETRON 2 MG/ML
4 INJECTION INTRAMUSCULAR; INTRAVENOUS EVERY 30 MIN PRN
Status: DISCONTINUED | OUTPATIENT
Start: 2020-12-29 | End: 2020-12-29 | Stop reason: HOSPADM

## 2020-12-29 RX ORDER — ONDANSETRON 4 MG/1
4 TABLET, ORALLY DISINTEGRATING ORAL EVERY 8 HOURS PRN
Qty: 3 TABLET | Refills: 0 | Status: SHIPPED | OUTPATIENT
Start: 2020-12-29 | End: 2021-01-01

## 2020-12-29 RX ADMIN — ONDANSETRON 4 MG: 2 INJECTION INTRAMUSCULAR; INTRAVENOUS at 10:03

## 2020-12-29 RX ADMIN — KETOROLAC TROMETHAMINE 30 MG: 30 INJECTION, SOLUTION INTRAMUSCULAR at 10:03

## 2020-12-29 NOTE — LETTER
December 29, 2020      To Whom It May Concern:      Morgan Wills was seen in our Emergency Department today, 12/29/20.  I expect his condition to improve over the next 2-3 days.  He may return to work when improved.    Sincerely,        Nathanyolette Dudley MD

## 2020-12-29 NOTE — ED PROVIDER NOTES
History     Chief Complaint   Patient presents with     Head Injury     HPI  Morgan Wills is a 40 year old male who presents with complaints of headache, blurred vision, confusion, nausea but no vomiting.  Also has some left lateral rib pain.  Yesterday afternoon patient unfortunately fell at work.  He about 8 feet hitting his head on a steel beam and hitting his left ribs on a pallet.  He does not think he had loss of consciousness is remembers one of his coworkers coming up to ask him how he was doing.  Since the incident he has continued headache and above symptoms.  He denies any anterior chest pain.  Increased left lateral chest pain with inspiration.  No productive cough.  No neck or low back pain.  No injury to the extremities.  No paresthesias.  Able to ambulate without assistance.  Currently denies dizziness.  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  Is a daily smoker.  No treatment for his headache or symptoms prior to arrival.  Patient claims a concussion 3 to 4 months ago after he was hit in the head with a softball.    Allergies:  Allergies   Allergen Reactions     Chantix [Varenicline]      Cinnamon      Coconut Flavor      Keppra [Levetiracetam] Other (See Comments)     Depression/hallunications      Rice        Problem List:    Patient Active Problem List    Diagnosis Date Noted     Spell of altered consciousness 02/20/2019     Priority: Medium        Past Medical History:    History reviewed. No pertinent past medical history.    Past Surgical History:    History reviewed. No pertinent surgical history.    Family History:    History reviewed. No pertinent family history.    Social History:  Marital Status:  Single [1]  Social History     Tobacco Use     Smoking status: Current Every Day Smoker     Packs/day: 0.25     Types: Cigarettes     Smokeless tobacco: Never Used   Substance Use Topics     Alcohol use: Yes     Comment: occ     Drug use: Not Currently        Medications:          HYDROcodone-acetaminophen (NORCO) 5-325 MG tablet       ondansetron (ZOFRAN ODT) 4 MG ODT tab       divalproex sodium delayed-release (DEPAKOTE) 500 MG DR tablet       escitalopram (LEXAPRO) 5 MG tablet       pantoprazole (PROTONIX) 40 MG EC tablet          Review of Systems other systems are reviewed and are negative.    Physical Exam   BP: 133/86  Pulse: 77  Temp: 98.3  F (36.8  C)  Resp: 16  Weight: 118.8 kg (262 lb)  SpO2: 97 %      Physical Exam General alert cooperative male who does not look toxic but appears in moderate distress.  HEENT there is no scalp contusion or abrasion.  No hemotympanum or lewis signs.  No raccoons eyes.  His pupils are constricted but reactive.  They are equal.  Extraocular motions are intact without nystagmus.  No rhinorrhea or epistasis.  No dental trauma or injury.  Speech is clear.  Uvula is midline and gag is intact.  Neck is supple and back is nontender in the midline.  Lungs are clear without adventitious sounds.  On his left lateral ribs he has tenderness without crepitus or step-off.  No bruising or abrasion.  His chest and abdomen are benign.  No CVA tenderness.  Extremities are without edema.  Neurologically no motor or sensory abnormalities are noted.  Patient ambulated ER without assistance.    ED Course        Procedures               Critical Care time:  none               Results for orders placed or performed during the hospital encounter of 12/29/20 (from the past 24 hour(s))   CBC with platelets differential   Result Value Ref Range    WBC 7.0 4.0 - 11.0 10e9/L    RBC Count 5.48 4.4 - 5.9 10e12/L    Hemoglobin 16.2 13.3 - 17.7 g/dL    Hematocrit 45.6 40.0 - 53.0 %    MCV 83 78 - 100 fl    MCH 29.6 26.5 - 33.0 pg    MCHC 35.5 31.5 - 36.5 g/dL    RDW 12.2 10.0 - 15.0 %    Platelet Count 219 150 - 450 10e9/L    Diff Method Automated Method     % Neutrophils 65.4 %    % Lymphocytes 25.9 %    % Monocytes 6.2 %    % Eosinophils 1.6 %    % Basophils 0.6 %    %  Immature Granulocytes 0.3 %    Nucleated RBCs 0 0 /100    Absolute Neutrophil 4.6 1.6 - 8.3 10e9/L    Absolute Lymphocytes 1.8 0.8 - 5.3 10e9/L    Absolute Monocytes 0.4 0.0 - 1.3 10e9/L    Absolute Basophils 0.0 0.0 - 0.2 10e9/L    Abs Immature Granulocytes 0.0 0 - 0.4 10e9/L    Absolute Nucleated RBC 0.0    INR   Result Value Ref Range    INR 0.96 0.86 - 1.14   Basic metabolic panel   Result Value Ref Range    Sodium 140 133 - 144 mmol/L    Potassium 4.4 3.4 - 5.3 mmol/L    Chloride 109 94 - 109 mmol/L    Carbon Dioxide 25 20 - 32 mmol/L    Anion Gap 6 3 - 14 mmol/L    Glucose 98 70 - 99 mg/dL    Urea Nitrogen 15 7 - 30 mg/dL    Creatinine 0.78 0.66 - 1.25 mg/dL    GFR Estimate >90 >60 mL/min/[1.73_m2]    GFR Estimate If Black >90 >60 mL/min/[1.73_m2]    Calcium 9.3 8.5 - 10.1 mg/dL   CT Head w/o Contrast    Narrative    CT OF THE HEAD WITHOUT CONTRAST December 29, 2020 9:48 AM     HISTORY: Closed head injury with confusion.    TECHNIQUE: Axial CT images of the head from the skull base to the  vertex were acquired without IV contrast. Radiation dose for this scan  was reduced using automated exposure control, adjustment of the mA  and/or kV according to patient size, or iterative reconstruction  technique.    COMPARISON: Brain MR 2/20/2019.    FINDINGS: The ventricles and basal cisterns are within normal limits  in configuration. There is no midline shift. There are no extra-axial  fluid collections. Gray-white differentiation is well maintained.    No intracranial hemorrhage, mass or recent infarct.    The visualized paranasal sinuses are well-aerated. There is no  mastoiditis. There are no fractures of the visualized bones.      Impression    IMPRESSION: Normal head CT.         RAHEEL DE LA CRUZ MD   XR Ribs & Chest Lt 3v    Narrative    XR RIBS & CHEST LT 3VW 12/29/2020 9:59 AM     HISTORY: Left lateral rib pain after fall      Impression    IMPRESSION: No apparent left rib displaced fracture. The lungs  appear  clear. No apparent pneumothorax or pleural effusion.    JOCY MUSA MD       Medications   ondansetron (ZOFRAN) injection 4 mg (4 mg Intravenous Given 12/29/20 1003)   ketorolac (TORADOL) injection 30 mg (30 mg Intravenous Given 12/29/20 1003)     Patient is given Zofran and Toradol.  CT of the head and chest x-ray with rib details are ordered.  Assessments & Plan (with Medical Decision Making)   Morgan Wills is a 40 year old male who presents with complaints of headache, blurred vision, confusion, nausea but no vomiting.  Also has some left lateral rib pain.  Yesterday afternoon patient unfortunately fell at work.  He about 8 feet hitting his head on a steel beam and hitting his left ribs on a pallet.  He does not think he had loss of consciousness is remembers one of his coworkers coming up to ask him how he was doing.  Since the incident he has continued headache and above symptoms.  He denies any anterior chest pain.  Increased left lateral chest pain with inspiration.  No productive cough.  No neck or low back pain.  No injury to the extremities.  No paresthesias.  Able to ambulate without assistance.  Currently denies dizziness.  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  Is a daily smoker.  No treatment for his headache or symptoms prior to arrival.  Patient claims a concussion 3 to 4 months ago after he was hit in the head with a softball.  Presentation patient was afebrile and vitally stable.  Is not hypertensive.  There is no scalp lesions appreciated.  No evidence of hemotympanum or lewis signs.  No raccoons eyes.  No epistasis or rhinorrhea.  No facial droop or asymmetry.  Did have photophobia.  Neck and back were nontender.  He had tenderness of the left lateral ribs.  Neurologically nonfocal.  CT imaging of the head showed no acute abnormality as noted above..  Chest x-ray with rib details was negative for any displaced rib fractures on the left.  Lungs were clear and no  pneumothorax or effusion was present.  Suspect he has a concussion and will be given information regarding this.  Referral to concussive services.  He can take ibuprofen or Vicodin for pain.  Zofran for recurrent nausea.  Advised him to limit excessive stimulation such as computer screen time.  Work excuse is given for the next 2 to 3 days.  Reasons to return to the emergency were discussed.  Wife did come and pick him up.  I have reviewed the nursing notes.    I have reviewed the findings, diagnosis, plan and need for follow up with the patient.       New Prescriptions    HYDROCODONE-ACETAMINOPHEN (NORCO) 5-325 MG TABLET    Take 1 tablet by mouth every 6 hours as needed for severe pain    ONDANSETRON (ZOFRAN ODT) 4 MG ODT TAB    Take 1 tablet (4 mg) by mouth every 8 hours as needed for nausea       Final diagnoses:   Closed head injury, initial encounter   Concussion without loss of consciousness, initial encounter   Rib pain on left side       12/29/2020   [facility] EMERGENCY DEPT     aNthan Dudley MD  12/29/20 [id number]

## 2020-12-29 NOTE — DISCHARGE INSTRUCTIONS
Avoid strenuous activity, excessive computer or phone screen time, or television.  You can use ice to the ribs and scalp for pain and swelling.  Ibuprofen or Vicodin for pain.  Zofran for nausea.  Referral to concussive services.

## 2020-12-29 NOTE — ED AVS SNAPSHOT
[facility] Emergency Dept  [address] [place] [address]  Phone: [phone]  Fax: [phone]                                    Morgan Wills   MRN: [id number]    Department: [facility] Emergency Dept   Date of Visit: 12/29/2020           After Visit Summary Signature Page    I have received my discharge instructions, and my questions have been answered. I have discussed any challenges I see with this plan with the nurse or doctor.    ..........................................................................................................................................  Patient/Patient Representative Signature      ..........................................................................................................................................  Patient Representative Print Name and Relationship to Patient    ..................................................               ................................................  Date                                   Time    ..........................................................................................................................................  Reviewed by Signature/Title    ...................................................              ..............................................  Date                                               Time          22EPIC Rev 08/18

## 2020-12-29 NOTE — ED NOTES
Writer contacted pts wife, Desire, to update her and notify her of pts discharge. She is located in [place] and will be on her way soon. Provider and pt notified.

## 2020-12-29 NOTE — ED TRIAGE NOTES
Pt comes in after falling approximately 8 feet and hitting his head on a steel beam yesterday afternoon. Pt is confused, has nausea and did vomit prior to coming in. Pt complains of a HA.

## 2021-01-19 ENCOUNTER — HOSPITAL ENCOUNTER (OUTPATIENT)
Dept: NEUROLOGY | Facility: CLINIC | Age: 41
Setting detail: THERAPIES SERIES
Discharge: STILL ADMITTED OR ADMINISTRATIVE | End: 2021-01-19
Attending: NURSE PRACTITIONER

## 2021-01-19 DIAGNOSIS — R45.4 IRRITABILITY: ICD-10-CM

## 2021-01-19 DIAGNOSIS — F06.4 ANXIETY DISORDER DUE TO MEDICAL CONDITION: ICD-10-CM

## 2021-01-19 DIAGNOSIS — G44.309 POST-CONCUSSION HEADACHE: ICD-10-CM

## 2021-01-19 DIAGNOSIS — R11.0 NAUSEA: ICD-10-CM

## 2021-01-19 DIAGNOSIS — Z02.6 ENCOUNTER RELATED TO WORKER'S COMPENSATION CLAIM: ICD-10-CM

## 2021-01-19 DIAGNOSIS — R41.3 MEMORY DIFFICULTIES: ICD-10-CM

## 2021-01-19 DIAGNOSIS — R53.83 FATIGUE, UNSPECIFIED TYPE: ICD-10-CM

## 2021-01-19 DIAGNOSIS — R42 DIZZINESS: ICD-10-CM

## 2021-01-19 DIAGNOSIS — M54.40 LOW BACK PAIN WITH SCIATICA, SCIATICA LATERALITY UNSPECIFIED, UNSPECIFIED BACK PAIN LATERALITY, UNSPECIFIED CHRONICITY: ICD-10-CM

## 2021-01-19 DIAGNOSIS — F07.81 POSTCONCUSSION SYNDROME: ICD-10-CM

## 2021-01-19 DIAGNOSIS — R41.840 ATTENTION AND CONCENTRATION DEFICIT: ICD-10-CM

## 2021-01-19 DIAGNOSIS — Z76.89 RETURN TO WORK EVALUATION: ICD-10-CM

## 2021-01-19 DIAGNOSIS — G47.00 INSOMNIA, UNSPECIFIED TYPE: ICD-10-CM

## 2021-02-23 DIAGNOSIS — Z11.59 ENCOUNTER FOR SCREENING FOR OTHER VIRAL DISEASES: ICD-10-CM

## 2021-03-11 ENCOUNTER — ANESTHESIA EVENT (OUTPATIENT)
Dept: GASTROENTEROLOGY | Facility: CLINIC | Age: 41
End: 2021-03-11
Payer: COMMERCIAL

## 2021-03-12 ENCOUNTER — ANESTHESIA (OUTPATIENT)
Dept: GASTROENTEROLOGY | Facility: CLINIC | Age: 41
End: 2021-03-12
Payer: COMMERCIAL

## 2021-03-12 ENCOUNTER — HOSPITAL ENCOUNTER (OUTPATIENT)
Facility: CLINIC | Age: 41
Discharge: HOME | End: 2021-03-12
Attending: INTERNAL MEDICINE | Admitting: INTERNAL MEDICINE
Payer: COMMERCIAL

## 2021-03-12 VITALS
DIASTOLIC BLOOD PRESSURE: 58 MMHG | HEART RATE: 62 BPM | SYSTOLIC BLOOD PRESSURE: 103 MMHG | BODY MASS INDEX: 33.72 KG/M2 | WEIGHT: 249 LBS | HEIGHT: 72 IN | OXYGEN SATURATION: 91 % | RESPIRATION RATE: 13 BRPM

## 2021-03-12 LAB — COLONOSCOPY: NORMAL

## 2021-03-12 PROCEDURE — 258N000003 HC RX IP 258 OP 636: Performed by: REGISTERED NURSE

## 2021-03-12 PROCEDURE — 370N000017 HC ANESTHESIA TECHNICAL FEE, PER MIN: Performed by: INTERNAL MEDICINE

## 2021-03-12 PROCEDURE — 250N000009 HC RX 250: Performed by: REGISTERED NURSE

## 2021-03-12 PROCEDURE — 999N000010 HC STATISTIC ANES STAT CODE-CRNA PER MINUTE: Performed by: INTERNAL MEDICINE

## 2021-03-12 PROCEDURE — 45385 COLONOSCOPY W/LESION REMOVAL: CPT | Performed by: INTERNAL MEDICINE

## 2021-03-12 PROCEDURE — 250N000011 HC RX IP 250 OP 636: Performed by: REGISTERED NURSE

## 2021-03-12 PROCEDURE — 88305 TISSUE EXAM BY PATHOLOGIST: CPT | Mod: 26 | Performed by: PATHOLOGY

## 2021-03-12 PROCEDURE — 45380 COLONOSCOPY AND BIOPSY: CPT | Mod: XU | Performed by: INTERNAL MEDICINE

## 2021-03-12 PROCEDURE — 88305 TISSUE EXAM BY PATHOLOGIST: CPT | Mod: TC | Performed by: INTERNAL MEDICINE

## 2021-03-12 RX ORDER — NALOXONE HYDROCHLORIDE 0.4 MG/ML
0.4 INJECTION, SOLUTION INTRAMUSCULAR; INTRAVENOUS; SUBCUTANEOUS
Status: CANCELLED | OUTPATIENT
Start: 2021-03-12 | End: 2021-03-13

## 2021-03-12 RX ORDER — ONDANSETRON 2 MG/ML
4 INJECTION INTRAMUSCULAR; INTRAVENOUS
Status: CANCELLED | OUTPATIENT
Start: 2021-03-12

## 2021-03-12 RX ORDER — ONDANSETRON 4 MG/1
4 TABLET, ORALLY DISINTEGRATING ORAL EVERY 6 HOURS PRN
Status: CANCELLED | OUTPATIENT
Start: 2021-03-12

## 2021-03-12 RX ORDER — FLUMAZENIL 0.1 MG/ML
0.2 INJECTION, SOLUTION INTRAVENOUS
Status: CANCELLED | OUTPATIENT
Start: 2021-03-12 | End: 2021-03-12

## 2021-03-12 RX ORDER — PROCHLORPERAZINE MALEATE 10 MG
10 TABLET ORAL EVERY 6 HOURS PRN
Status: CANCELLED | OUTPATIENT
Start: 2021-03-12

## 2021-03-12 RX ORDER — LIDOCAINE 40 MG/G
CREAM TOPICAL
Status: CANCELLED | OUTPATIENT
Start: 2021-03-12

## 2021-03-12 RX ORDER — SODIUM CHLORIDE, SODIUM LACTATE, POTASSIUM CHLORIDE, CALCIUM CHLORIDE 600; 310; 30; 20 MG/100ML; MG/100ML; MG/100ML; MG/100ML
INJECTION, SOLUTION INTRAVENOUS CONTINUOUS PRN
Status: DISCONTINUED | OUTPATIENT
Start: 2021-03-12 | End: 2021-03-12

## 2021-03-12 RX ORDER — LIDOCAINE HYDROCHLORIDE 20 MG/ML
INJECTION, SOLUTION INFILTRATION; PERINEURAL PRN
Status: DISCONTINUED | OUTPATIENT
Start: 2021-03-12 | End: 2021-03-12

## 2021-03-12 RX ORDER — NALOXONE HYDROCHLORIDE 0.4 MG/ML
0.2 INJECTION, SOLUTION INTRAMUSCULAR; INTRAVENOUS; SUBCUTANEOUS
Status: CANCELLED | OUTPATIENT
Start: 2021-03-12 | End: 2021-03-13

## 2021-03-12 RX ORDER — PROPOFOL 10 MG/ML
INJECTION, EMULSION INTRAVENOUS CONTINUOUS PRN
Status: DISCONTINUED | OUTPATIENT
Start: 2021-03-12 | End: 2021-03-12

## 2021-03-12 RX ORDER — FLUOXETINE 10 MG/1
CAPSULE ORAL
COMMUNITY
Start: 2021-02-16

## 2021-03-12 RX ORDER — PROPOFOL 10 MG/ML
INJECTION, EMULSION INTRAVENOUS PRN
Status: DISCONTINUED | OUTPATIENT
Start: 2021-03-12 | End: 2021-03-12

## 2021-03-12 RX ORDER — ESOMEPRAZOLE MAGNESIUM 40 MG/1
40 CAPSULE, DELAYED RELEASE ORAL 2 TIMES DAILY
COMMUNITY

## 2021-03-12 RX ORDER — ONDANSETRON 2 MG/ML
INJECTION INTRAMUSCULAR; INTRAVENOUS PRN
Status: DISCONTINUED | OUTPATIENT
Start: 2021-03-12 | End: 2021-03-12

## 2021-03-12 RX ORDER — TRAZODONE HYDROCHLORIDE 100 MG/1
50-100 TABLET ORAL
COMMUNITY
Start: 2021-03-17

## 2021-03-12 RX ORDER — ONDANSETRON 2 MG/ML
4 INJECTION INTRAMUSCULAR; INTRAVENOUS EVERY 6 HOURS PRN
Status: CANCELLED | OUTPATIENT
Start: 2021-03-12

## 2021-03-12 RX ADMIN — PHENYLEPHRINE HYDROCHLORIDE 100 MCG: 10 INJECTION INTRAVENOUS at 09:46

## 2021-03-12 RX ADMIN — LIDOCAINE HYDROCHLORIDE 100 MG: 20 INJECTION, SOLUTION INFILTRATION; PERINEURAL at 09:39

## 2021-03-12 RX ADMIN — PHENYLEPHRINE HYDROCHLORIDE 100 MCG: 10 INJECTION INTRAVENOUS at 09:54

## 2021-03-12 RX ADMIN — DEXMEDETOMIDINE HYDROCHLORIDE 8 MCG: 100 INJECTION, SOLUTION INTRAVENOUS at 09:43

## 2021-03-12 RX ADMIN — PROPOFOL 50 MG: 10 INJECTION, EMULSION INTRAVENOUS at 09:40

## 2021-03-12 RX ADMIN — ONDANSETRON 4 MG: 2 INJECTION INTRAMUSCULAR; INTRAVENOUS at 09:47

## 2021-03-12 RX ADMIN — PROPOFOL 150 MCG/KG/MIN: 10 INJECTION, EMULSION INTRAVENOUS at 09:40

## 2021-03-12 RX ADMIN — DEXMEDETOMIDINE HYDROCHLORIDE 8 MCG: 100 INJECTION, SOLUTION INTRAVENOUS at 09:49

## 2021-03-12 RX ADMIN — PROPOFOL 40 MG: 10 INJECTION, EMULSION INTRAVENOUS at 09:42

## 2021-03-12 RX ADMIN — PHENYLEPHRINE HYDROCHLORIDE 100 MCG: 10 INJECTION INTRAVENOUS at 10:01

## 2021-03-12 RX ADMIN — DEXMEDETOMIDINE HYDROCHLORIDE 12 MCG: 100 INJECTION, SOLUTION INTRAVENOUS at 09:40

## 2021-03-12 RX ADMIN — SODIUM CHLORIDE, SODIUM LACTATE, POTASSIUM CHLORIDE, CALCIUM CHLORIDE: 600; 310; 30; 20 INJECTION, SOLUTION INTRAVENOUS at 09:36

## 2021-03-12 ASSESSMENT — ENCOUNTER SYMPTOMS: SEIZURES: 1

## 2021-03-12 ASSESSMENT — MIFFLIN-ST. JEOR: SCORE: 2082.46

## 2021-03-12 NOTE — ANESTHESIA CARE TRANSFER NOTE
Patient: Morgan Wills    Procedure(s):  COLONOSCOPY, WITH POLYPECTOMY AND BIOPSY  Colonoscopy, Flexible, With Lesion Removal Using Snare    Diagnosis: Diarrhea, unspecified type [R19.7]  Diagnosis Additional Information: No value filed.    Anesthesia Type:   MAC     Note:    Oropharynx: oropharynx clear of all foreign objects and spontaneously breathing  Level of Consciousness: drowsy  Oxygen Supplementation: nasal cannula  Level of Supplemental Oxygen (L/min / FiO2): 2  Independent Airway: airway patency satisfactory and stable  Dentition: dentition unchanged  Vital Signs Stable: post-procedure vital signs reviewed and stable  Report to RN Given: handoff report given  Patient transferred to: Phase II  Comments: At end of procedure, spontaneous respirations, patient alert to voice, able to follow commands. Oxygen via nasal cannula at 2 liters per minute to PACU. Oxygen tubing connected to wall O2 in PACU, SpO2, NiBP, and EKG monitors and alarms on and functioning, Demar Hugger warmer connected to patient gown, report on patient's clinical status given to PACU RN, RN questions answered.  Handoff Report: Identifed the Patient, Identified the Reponsible Provider, Reviewed the pertinent medical history, Discussed the surgical course, Reviewed Intra-OP anesthesia mangement and issues during anesthesia, Set expectations for post-procedure period and Allowed opportunity for questions and acknowledgement of understanding      Vitals: (Last set prior to Anesthesia Care Transfer)    99/57  CRNA VITALS  3/12/2021 0933 - 3/12/2021 1005      3/12/2021             Pulse:  63    Ht Rate:  62    SpO2:  97 %    Resp Rate (set):  10        Electronically Signed By: JENNIFER Rock CRNA  March 12, 2021  10:05 AM

## 2021-03-12 NOTE — ANESTHESIA PREPROCEDURE EVALUATION
Anesthesia Pre-Procedure Evaluation    Patient: Abigail Wills   MRN: [id number] : 1980        Preoperative Diagnosis: Diarrhea, unspecified type [R19.7]   Procedure : Procedure(s):  COLONOSCOPY     No past medical history on file.   No past surgical history on file.   Allergies   Allergen Reactions     Chantix [Varenicline]      Cinnamon      Coconut Flavor      Keppra [Levetiracetam] Other (See Comments)     Depression/hallunications      Rice       Social History     Tobacco Use     Smoking status: Current Every Day Smoker     Packs/day: 0.25     Types: Cigarettes     Smokeless tobacco: Never Used   Substance Use Topics     Alcohol use: Yes     Comment: occ      Wt Readings from Last 1 Encounters:   20 118.8 kg (262 lb)        Anesthesia Evaluation   Pt has had prior anesthetic.     No history of anesthetic complications       ROS/MED HX  ENT/Pulmonary:  - neg pulmonary ROS     Neurologic:     (+) seizures, last seizure: 8 months ago-trauma related,     Cardiovascular: Comment: This result has an attachment that is not available.  Result Narrative  [address] [place] [address]  [phone]  [address]              MYOCARDIAL PERFUSION IMAGING REPORT  REST/STRESS SINGLE ISOTOPE GATED SPECT IMAGING USING GD.      Patient Name:   ABIGAIL WILLS        Gender:           ALYSSIA  MRN:            [id number]            Height:           73 in  Accession #:    [id number]             Weight:           268 lb  Study Date:     2020 10:57:22 AM BSA:              2.44 mÂ   :            1980 40 years    BMI:              35.36 kg/mÂ   Ord. Prov.:     SOLANGE MANN [facility]  Monitoring Prov.: Eduardo Ramsey MD  Performing Site [facility]      Clinical History:     Chest pain. Suspicion of coronary artery disease.  Cardiac Risk Factors: Hypertension and tobacco use.  Cardiac History:      None known.  Beta blocker/calcium channel blocker/nitrate taken today: No.  Caffeine/methylxanthine taken within 12  hrs:              No.  Chest pain/discomfort at baseline:                        Yes.      IMPRESSION   1. Adequate pharmacologic stress test with regadenoson and low level exercise.   2. The pharmacologic stress ECG was normal.   3. Myocardial perfusion was normal.   4. Left ventricular cavity size was normal ( ml).   5. Overall left ventricular systolic function was normal without wall motion abnormalities. The post stress LVEF was calculated to be 63 %.   6. There were no prior studies available for comparison.    STRESS MPI PROCEDURE  The patient was studied utilizing a same day rest/stress protocol. Myocardial perfusion imaging was performed at rest, 30 minutes following the intravenous injection of 9.60 mCi of 99mTc sestamibi. 30 seconds after the 15 second IV regadenoson injection, the patient was injected via IV with 32.2 mCi of 99mTc sestamibi. Gated post-stress tomographic imaging with attenuation correction was performed 10 minutes after stress. After image acquisition was completed, data was reconstructed in short, horizontal long and vertical long axis views and tomographic slices were generated.  - Pharmacologic stress testing was performed with an IV regadenoson dose of 0.4  mg.  - Low level exercise consisting of walking on treadmill at 1.0 mph with a 0%  grade was performed for 1 minute prior to, during, and 2 minutes after the  vasodilator infusion.  - Resting heart rate was 70 bpm, peak heart rate was 112 bpm.  - Resting blood pressure was 132/78 mmHg; peak blood pressure was 108/78 mmHg.  - The patient developed symptoms which included shortness of breath and  lightheaded.  - 50 mg IV aminophylline was administered 4:00 minutes into recovery.    FINDINGS  Baseline ECG  - The baseline ECG was normal with sinus rhythm and normal conduction.  - There were no repolarization abnormalities.  - There was no atrial or ventricular ectopy.    Stress ECG  - The stress ECG was normal.  - There were no  stress induced arrhythmias.  - Repolarization was normal.    Imaging  - The overall quality of the study was excellent with mild soft tissue  attenuation on rest and stress studies. Computerized motion correction was not  applied to rest and stress studies.  - SPECT perfusion images were normal without evidence of ischemia or infarction.  - Post stress gated imaging revealed normal left ventricular size with a  calculated LVEF of 63 %. (Lab normals: LVEF >50%, LV Size <150 ml).  - There was normal post-stress myocardial thickening and wall motion.  - No right ventricular abnormalities were identified.  - There was no evidence of abnormal lung or extracardiac activity.  - Risk/extent of ischemia per ACC Noninvasive Risk Stratification Guideline: LOW  RISK.      This study was interpreted and electronically signed by Santos Del Cid MD on 2020 1:19:28 PM.  ECG interpreted by: Santos Del Cid MD Cardiology    Performed by an [facility] Nuclear/PET Accredited Facility          Final (Updated)    Other Result Information  Interface, [employer] - 2020  2:17 PM T  [address] [place] [id number]  [phone]  [employer]              MYOCARDIAL PERFUSION IMAGING REPORT  REST/STRESS SINGLE ISOTOPE GATED SPECT IMAGING USING GD.      Patient Name:   ABIGAIL GUIDRY        Gender:           ALYSSIA  MRN:            [id number]            Height:           73 in  Accession #:    [id number]             Weight:           268 lb  Study Date:     2020 10:57:22 AM BSA:              2.44 mÂ   :            1980 40 years    BMI:              35.36 kg/mÂ   Ord. Prov.:     SOLANGE MANN [facility]  Monitoring Prov.: Eduardo Ramsey MD  Performing Site [facility]      Clinical History:     Chest pain. Suspicion of coronary artery disease.  Cardiac Risk Factors: Hypertension and tobacco use.  Cardiac History:      None known.  Beta blocker/calcium channel blocker/nitrate taken today: No.  Caffeine/methylxanthine taken within 12 hrs:               No.  Chest pain/discomfort at baseline:                        Yes.      IMPRESSION   1. Adequate pharmacologic stress test with regadenoson and low level exercise.   2. The pharmacologic stress ECG was normal.   3. Myocardial perfusion was normal.   4. Left ventricular cavity size was normal ( ml).   5. Overall left ventricular systolic function was normal without wall motion abnormalities. The post stress LVEF was calculated to be 63 %.   6. There were no prior studies available for comparison.    STRESS MPI PROCEDURE  The patient was studied utilizing a same day rest/stress protocol. Myocardial perfusion imaging was performed at rest, 30 minutes following the intravenous injection of 9.60 mCi of 99mTc sestamibi. 30 seconds after the 15 second IV regadenoson injection, the patient was injected via IV with 32.2 mCi of 99mTc sestamibi. Gated post-stress tomographic imaging with attenuation correction was performed 10 minutes after stress. After image acquisition was completed, data was reconstructed in short, horizontal long and vertical long axis views and tomographic slices were generated.  - Pharmacologic stress testing was performed with an IV regadenoson dose of 0.4  mg.  - Low level exercise consisting of walking on treadmill at 1.0 mph with a 0%  grade was performed for 1 minute prior to, during, and 2 minutes after the  vasodilator infusion.  - Resting heart rate was 70 bpm, peak heart rate was 112 bpm.  - Resting blood pressure was 132/78 mmHg; peak blood pressure was 108/78 mmHg.  - The patient developed symptoms which included shortness of breath and  lightheaded.  - 50 mg IV aminophylline was administered 4:00 minutes into recovery.    FINDINGS  Baseline ECG  - The baseline ECG was normal with sinus rhythm and normal conduction.  - There were no repolarization abnormalities.  - There was no atrial or ventricular ectopy.    Stress ECG  - The stress ECG was normal.  - There were no  stress induced arrhythmias.  - Repolarization was normal.    Imaging  - The overall quality of the study was excellent with mild soft tissue  attenuation on rest and stress studies. Computerized motion correction was not  applied to rest and stress studies.  - SPECT perfusion images were normal without evidence of ischemia or infarction.  - Post stress gated imaging revealed normal left ventricular size with a  calculated LVEF of 63 %. (Lab normals: LVEF >50%, LV Size <150 ml).  - There was normal post-stress myocardial thickening and wall motion.  - No right ventricular abnormalities were identified.  - There was no evidence of abnormal lung or extracardiac activity.  - Risk/extent of ischemia per ACC Noninvasive Risk Stratification Guideline: LOW  RISK.      This study was interpreted and electronically signed by Santos Del Cid MD on 9/22/2020 1:19:28 PM.  ECG interpreted by: Santos Del Cid MD Cardiology    Performed by an [facility] Nuclear/PET Accredited Facility          Final (Updated)       - neg cardiovascular ROS     METS/Exercise Tolerance:     Hematologic:  - neg hematologic  ROS     Musculoskeletal:       GI/Hepatic:     (+) GERD,     Renal/Genitourinary:  - neg Renal ROS     Endo:    (-) Type II DM   Psychiatric/Substance Use:       Infectious Disease:       Malignancy:       Other:            Physical Exam    Airway        Mallampati: II   TM distance: > 3 FB   Neck ROM: full   Mouth opening: > 3 cm    Respiratory Devices and Support         Dental  no notable dental history         Cardiovascular   cardiovascular exam normal          Pulmonary   pulmonary exam normal                OUTSIDE LABS:  CBC:   Lab Results   Component Value Date    WBC 7.0 12/29/2020    WBC 5.5 02/21/2019    HGB 16.2 12/29/2020    HGB 14.9 02/21/2019    HCT 45.6 12/29/2020    HCT 43.1 02/21/2019     12/29/2020     02/21/2019     BMP:   Lab Results   Component Value Date     12/29/2020     02/21/2019     POTASSIUM 4.4 12/29/2020    POTASSIUM 4.0 02/21/2019    CHLORIDE 109 12/29/2020    CHLORIDE 104 02/21/2019    CO2 25 12/29/2020    CO2 27 02/21/2019    BUN 15 12/29/2020    BUN 15 02/21/2019    CR 0.78 12/29/2020    CR 0.78 02/21/2019    GLC 98 12/29/2020    GLC 92 02/21/2019     COAGS:   Lab Results   Component Value Date    INR 0.96 12/29/2020     POC:   Lab Results   Component Value Date     (H) 02/20/2019     HEPATIC:   Lab Results   Component Value Date    ALBUMIN 3.7 02/20/2019    PROTTOTAL 7.2 02/20/2019    ALT 44 02/20/2019    AST 22 02/20/2019    ALKPHOS 77 02/20/2019    BILITOTAL 0.4 02/20/2019     OTHER:   Lab Results   Component Value Date    ARIANA 9.3 12/29/2020       Anesthesia Plan    ASA Status:  2      Anesthesia Type: MAC.              Consents    Anesthesia Plan(s) and associated risks, benefits, and realistic alternatives discussed. Questions answered and patient/representative(s) expressed understanding.     - Discussed with:  Patient         Postoperative Care            Comments:                Salvador Bray MD

## 2021-03-12 NOTE — ANESTHESIA POSTPROCEDURE EVALUATION
Patient: Morgan Wills    Procedure(s):  COLONOSCOPY, WITH POLYPECTOMY AND BIOPSY  Colonoscopy, Flexible, With Lesion Removal Using Snare    Diagnosis:Diarrhea, unspecified type [R19.7]  Diagnosis Additional Information: No value filed.    Anesthesia Type:  MAC    Note:  Disposition: Outpatient   Postop Pain Control: Uneventful            Sign Out: Well controlled pain   PONV: No   Neuro/Psych: Uneventful            Sign Out: Acceptable/Baseline neuro status   Airway/Respiratory: Uneventful            Sign Out: Acceptable/Baseline resp. status   CV/Hemodynamics: Uneventful            Sign Out: Acceptable CV status   Other NRE: NONE   DID A NON-ROUTINE EVENT OCCUR? No         Last vitals:  Vitals:    03/12/21 1020 03/12/21 1030 03/12/21 1040   BP: 103/54 99/55 103/58   Pulse: 68 66 62   Resp: 28 12 13   SpO2: 97% 92% 91%       Last vitals prior to Anesthesia Care Transfer:  CRNA VITALS  3/12/2021 0933 - 3/12/2021 1033      3/12/2021             Pulse:  63    Ht Rate:  62    SpO2:  97 %    Resp Rate (set):  10          Electronically Signed By: Salvador Bray MD  March 12, 2021  2:14 PM

## 2021-03-17 LAB — COPATH REPORT: NORMAL

## 2021-07-20 NOTE — LETTER
Letter by Iram Fox FNP at      Author: Iram Fox FNP Service: -- Author Type: --    Filed:  Date of Service:  Status: (Other)         January 19, 2021     Patient: Morgan Wills   YOB: 1980   Date of Visit: 1/19/2021       To Whom It May Concern:    It is my medical opinion that Morgan Wills may return to work on 1/19/2021. Employees recovering from concussions often exhibit cognitive symptoms that make attending work and learning difficult. They may not be able to attend work or only partial days. Some symptoms that could affect performance in the work environment  include: sensitivity to light and noise, headache, trouble focusing, concentrating, or remembering, and difficulty looking at a screen. The accommodations below often help reduce the symptoms and allow them to return to work quicker. Compliance with these accommodations allows the brain to recover more quickly even if it appears the employee is symptom free.     Attendance Restrictions  Patient will still work only 3 days a week (Monday, Wednesday and Friday). he will continue to work only these days until he reaches 8 hours a day. He will work 4 hour(s) a day, if he is able to tolerate (tolerate is no return of severe symptoms). If patient is able to tolerate 3 consecutive shifts the patient may  increase hours a day worked by 2 hour(s).   Other Restrictions:  1. Limit exposure to computer screens  2. Allow patient to take 10 minute breaks every 60 minutes   3. Allow patient to take a break if he starts to have symptoms, If symptoms continue or worsen please allow patient to return home.  4. Allow employee to wear sunglasses and hat to help patient's sensitivity to lights. Remove any overhead lighting and replace with lamps if possible. Move patient to a office if available.  5. Please put a screen filter on the patient's computer   6. If patient feels overwhelmed at the office please allow patient to work from home if  available  7. If patient wakes with severe headache please allow patient to start work later, it symptoms worsen patient should not attempt to work.      If you have any questions or concerns, please don't hesitate to call.    Sincerely,        Electronically signed by TRISTAN Pham

## 2021-07-20 NOTE — PROGRESS NOTES
"Video Visit  Morgan Wills is a 40 y.o. male who is being evaluated via a billable video visit in light of the ongoing global health crisis (COVID-19) that requires us to abide by social distancing mandates in order to reduce the risk of COVID-19 exposure.      The patient has been notified of following:     \"This telephone visit will be conducted via a call between you and your physician/provider. We have found that certain health care needs can be provided without the need for a physical exam.  This service lets us provide the care you need with a short phone conversation.  If a prescription is necessary we can send it directly to your pharmacy.  If lab work is needed we can place an order for that and you can then stop by our lab to have the test done at a later time.    If during the course of the call the physician/provider feels a telephone visit is not appropriate, you will not be charged for this service.\"     Patient has given verbal consent to a Telephone visit? Yes    Morgan Wills chief complaint is: Post Concussion Syndrome    Consent was obtained for this service by one of our care team members    ALLERGIES  Cinnamon bark, Coconut, Coconut oil, Dexbrompheniramine-phenyleph, Green tea, Green tea leaf extract, Rice extract, Varenicline, Keppra [levetiracetam], and Tea tree    Current PT  No      Current OT  No      Current ST  No       Current Chiropractic  No  Psychiatrist currently No  Past:  No  Psychologist currently No  Past:  Yes  Primary: Currently Yes                     MRI/CT Completed  Yes          Medications  Currently on medication to help you sleep  No     Mental health dx.- Anxiety, Depression, ADHD, Tourette, explosive temper, ADD    Currently on medication to help with mental health No       Currently on medication for concentration or ADD /ADHD     No         Are you on a controlled substance  No      Date of accident: 12/28/2020  Workman's Comp   Yes   QRC   No        How concussion " happened:     At work, 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  The pallet that he was standing on gave out, he fell backwards, on his left side, rolled down and then hit the back of the head on a steel beam.        LOC:  Unsure    Did you seek medical attention:  Yes   When :   Day after     MRI/CT Completed  Yes       Injury Description:               Was there a forcible blow to the head?:                Yes     Where on head? Back of the head                                              Retrograde Amnesia (loss of memory of events before the injury)?:  No  Anterograde Amnesia (loss of memory of events following injury)?:  Yes    Number of previous head injuries.        several  Sports injuries   Had all previous concussion symptoms resolved   Yes    Work/School  Currently employed     No    Retired    No     Disability  No   Title   Warehouse/sales      works at    [employer]     Normal hours per week  (Average before injury) 43        Have your returned to work?            No    Full hours:     No    Hours working a week currently  0  Any days off after concussion?                                Yes  The concussion symptoms are limiting his ability to work.  How dizziness, headaches, disoriented, trouble with vision, focus.      He is currently living with his wife of 2 years. Children living with you? 0  He reports developmental problems, learning disabilities, or history of ADHD.     Patient History  Patient was referred to the concussion clinic by [facility].     Phone Start Time: 8:50am    Phone End Time:  9:05am    Total time for phone call 15 minutes     Phone number Patient would like to use: [phone]    Carlene Peterson CMA       Plan:     Neuropsychological assessment   No, MC claim  PT to evaluate and treat  Yes, patient has previously worked with a doctor at the [facility] pain clinic, he would like to go back there  OT to  evaluate and treat  No  ST to evaluate and treat  No  Referral to ophthalmology   No  Referral to Neurology        No  Referral to psychology No  Referral to psychiatry  No  Other Referral   No  MRI/CT ordered today : No  Labs ordered today : No  New medication :  No, patient states that he will not take any medication      Discussed: RED FLAGS NEEDING ACUTE EMERGENCY MANAGEMENT:                          Headaches that worsen                          Seizures                          Focal Neurologic Signs                          Drowsiness/Lethargy                          Repeated vomiting                          Slurred Speech                          Inability to recognize people/places                          Increasing confusion/irritability                          Weakness/numbness                          Neck pain                          Unusual behavioral change                          Change in level of consciousness    Subjective:          HPI     At work, 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  The pallet that he was standing on gave out, he fell backwards, on his left side, rolled down and then hit the back of the head on a steel beam.        Headaches:  Significant ongoing headaches Yes  Headaches: Intermittently and Daily  Improvement :Yes   Current Headache No   Wake with HA  Yes     Worse Headache    9/10           How often: every couple days    Average Headache 5/10.    Best Headache 5/10.  Brings on HA:   Moving around too much  Makes symptoms worse  Doing too much   He does not take any OTC meds     Physical Symptoms:  Headache-Yes      Resolved No           Improved since accident Improved     Nausea- Yes      Resolved Yes         Vomiting - Yes       Resolved Yes          Balance problems - Yes       Resolved No Improved since accident Improved     Dizziness - Yes      Resolved No          Improved since accident Improved   Visual  problems - Yes, blurry and double      Resolved No           Improved since accident Improved    Fatigue - Yes     Resolved No           Improved since accident Improved    Sensitivity to light - No         Sensitivity to sound - No       Numbness/tingling - No          Cognitive Symptoms  Feeling mentally foggy - No          Feeling slowed down - Yes         Resolved No         Improved since accident Same    Difficulty Concentrating- Yes        Resolved   No     Improved since accident Same    Difficulty remembering - Yes         Resolved No       Improved since accident Same      Emotional Symptoms  Irritability - Yes        Resolved No         Improved since accident Same    Sadness-   No          More emotional - No        Nervousness/anxiety - Yes       Resolved No        Improved since accident Same      Psychiatric History:  Anxiety - Yes  Depression - Yes  Other mental health dx:  Yes  ADHD  Sleep Disorders - No  The patient denies being a victim of abuse.   Ever Hospitalized for mental health:             Yes  Any thought of hurting self or others now?   No  Any history of hurting self or others?            No    Family Psychiatric History:  Mother's side                              Yes  Father's side                               Yes  Adopted                                      No    Sleep History:  Drowsiness- Yes         Resolved No        Improved since accident Same and [place]    Sleep less than usual - No  Sleep more than usual - No  Trouble falling asleep - No        Does the patient wake feeling rested - No       Resolved No         Improved since accident Same      Migraine Headaches      Patient history of migraines.    No      Family history of migraines    No    Exertion:         Do the above stated symptoms worsen with physical activity? Yes        Do the above stated symptoms worsen with cognitive activity? Yes          Patient Active Problem List    Diagnosis Date Noted     Status  epilepticus (H) 02/17/2019     Seizure (H) 02/17/2019     Acute respiratory distress      No past medical history on file.  No past surgical history on file.  No family history on file.  Current Outpatient Medications   Medication Sig Dispense Refill     pantoprazole (PROTONIX) 40 MG tablet Take 40 mg by mouth.       No current facility-administered medications for this encounter.        Social History     Socioeconomic History     Marital status: Single     Spouse name: Not on file     Number of children: Not on file     Years of education: Not on file     Highest education level: Not on file   Occupational History     Not on file   Social Needs     Financial resource strain: Not on file     Food insecurity     Worry: Not on file     Inability: Not on file     Transportation needs     Medical: Not on file     Non-medical: Not on file   Tobacco Use     Smoking status: Current Some Day Smoker     Smokeless tobacco: Never Used   Substance and Sexual Activity     Alcohol use: Yes     Frequency: Monthly or less     Drinks per session: 3 or 4     Drug use: No     Sexual activity: Not on file   Lifestyle     Physical activity     Days per week: Not on file     Minutes per session: Not on file     Stress: Not on file   Relationships     Social connections     Talks on phone: Not on file     Gets together: Not on file     Attends Orthodoxy service: Not on file     Active member of club or organization: Not on file     Attends meetings of clubs or organizations: Not on file     Relationship status: Not on file     Intimate partner violence     Fear of current or ex partner: Not on file     Emotionally abused: Not on file     Physically abused: Not on file     Forced sexual activity: Not on file   Other Topics Concern     Not on file   Social History Narrative     Not on file       The following portions of the patient's history were reviewed and updated as appropriate: allergies, current medications, past family history,  past medical history, past social history, past surgical history and problem list.    Review of Systems  A comprehensive review of systems was negative except for what is noted above.    Objective:       Discussion was held with the patient today regarding concussion in general including types of injury, symptoms that are common, treatment and variability in time to recover. Education about concussion symptoms and length of time it would take the patient to recover was also given to the patient.  I have reassured the patient his symptoms are very common when a concussion is present and will improve with time. We discussed the risks and benefits of possible medication including risk of worsening depression with medication adjustments and even the possibility of emergence of suicidal ideations.       Total time spent with the patient today was 60 minutes with greater than 50% of the time spent in counseling and care coordination. The patient agrees to call with any questions, concerns or problems. We will assess for the appropriateness of possible psychotropic medication trials/changes. The patient will seek out appropriate emergency services should that become necessary    Physical Exam:   Neck:  Full ROM  Yes with pain or stiffness Yes    Neurologic:   Mental status: Alert, oriented, thought content appropriate.. Recent and remote memory grossly intact.  Yes  Speech is clear and fluent with no obvious word finding or paraphasic errors. Yes    Diagnosis managed and treated at today's visit :  Post concussion syndrome  Post concussion headache  Nausea  Dizziness  Fatigue  Insomnia  Sensitivity to light  Sound sensitivity  Concentration and Attention deficit  Memory difficulties  Anxiety d/t a medical condition  Irritability  Return to work  Encounter related to a worker's comp claim     Plan:  Medication Adjustment:  Patient will start PT with previous doctor, referral sent to [facility], C list sent  to patient, work note for accommodations at work    Other:   Patient will return to clinic in 4-5 weeks. They agree to call or return sooner with any questions or concerns.  Risks and benefits were discussed.  Continue with individual therapist if established     Continue with the support of the clinic, reassurance, and redirection. Staff monitoring and ongoing assessments per team plan.. This team will utilize appropriate emergency services if necessary. I will make myself available if concerns or problems arise.     Mental Status Examination    He is cooperative with questioning. He is fully engaged in conversation today. He is alert and fully oriented. Speech is normal. Thought processes normal with normal prehension and expression. Thoughts are organized and linear. Content is pertinent to the conversation and without evidence of auditory or visual hallucinations. No delusional ideation. Gen. fund of knowledge, insight and memory are normal     Consent was obtained for this service by one of our care team members    Telephone Visit Details    Type of service: Telephone Visit    Phone Start Time: 0910    Phone End Time:  0950    Total time for phone call: 40 minutes    Originating Location: Patient's home    Distant Location:  [facility]    Mode of Communication: Telephone call        20 minutes spent on the date of the encounter doing chart review, review of outside records and documentation       Patient Instructions   It was nice speaking with you today for our office visit held over the phone The following is a summary of our visit and my recommendations:    How to return to daily activities with concussion:  1. Get lots of rest. Be sure to get enough sleep at night- no late nights. Keep the same bedtime weekdays and weekends.   2. Take daytime naps or rest breaks when you feel tired or fatigued.  3. Limit physical activity as well as activities that require a lot of  "thinking or concentration. These activities can make symptoms worse and recovery time longer. In some cases, your doctor may prescribe time that you completely eliminate these activities to allow complete \"brain rest.\"  Physical activity includes going to the gym, sports practices, weight-training, running, exercising, heavy lifting, etc.  Thinking and concentration activities (e.g., cell phone texting, computer games, movies, parties, loud music and in severe cases may include limiting your time at work).  4. Drink lots of fluids and eat carbohydrates or protein to main appropriate blood sugar levels.  5. As symptoms decrease, with consent from your doctor, you may begin to gradually return to your daily activities. If symptoms worsen or return, lessen your activities, then try again to increase your activities gradually.   6. During recovery, it is normal to feel frustrated and sad when you do not feel right and you can t be as active as usual.  7. Repeated evaluation of your symptoms is recommended to help guide recovery. Please follow up as recommended by your doctor to ensure a safe and healthy recovery.    Watch for and go to the Emergency Department if you have any of the following symptoms:  Headaches that significantly worsen  Looks very drowsy or can t be awakened  Can t recognize people or places  Worsening neck pain  Seizures  Repeated vomiting  Increasing confusion or irritability  Unusual behavioral change  Slurred speech  Weakness or numbness in arms/legs  Change in state of consciousness    For more information, please visit on the Internet:  http://www.cdc.gov/concussion/get_help.html   http://www.cdc.gov/concussion/pdf/Facts_about_Concussion_TBI-a.pdf    General Information:  Today you had your appointment with Iram Fox CNP     If lab work was done today as part of your evaluation you will generally be contacted via My Chart, mail, or phone with the results within 1-5 days. If " there is an alarming result we will contact you by phone. Lab results come back at varying times, I generally wait until all labs are resulted before making comments on results. Please note labs are automatically released to My Chart once available.     If you need refills please contact your pharmacist. They will send a refill request to me to review. Please allow 3 business days for us to process all refill requests.     Please call or send a medical message through My Chart, with any questions or concerns    If you need any paperwork completed please fax forms to [phone]. Please state if you would like a copy of the completed paperwork, mailed or faxed back to the patient and a fax number to fax the paperwork to. Please allow up to 10 days for paperwork to be completed.    Iram Fox, CNP